# Patient Record
Sex: FEMALE | Race: WHITE | NOT HISPANIC OR LATINO
[De-identification: names, ages, dates, MRNs, and addresses within clinical notes are randomized per-mention and may not be internally consistent; named-entity substitution may affect disease eponyms.]

---

## 2020-08-28 PROBLEM — Z00.00 ENCOUNTER FOR PREVENTIVE HEALTH EXAMINATION: Status: ACTIVE | Noted: 2020-08-28

## 2020-09-21 ENCOUNTER — APPOINTMENT (OUTPATIENT)
Dept: ORTHOPEDIC SURGERY | Facility: CLINIC | Age: 75
End: 2020-09-21
Payer: COMMERCIAL

## 2020-09-21 VITALS — TEMPERATURE: 97.4 F

## 2020-09-21 DIAGNOSIS — T84.84XA PAIN DUE TO INTERNAL ORTHOPEDIC PROSTHETIC DEVICES, IMPLANTS AND GRAFTS, INITIAL ENCOUNTER: ICD-10-CM

## 2020-09-21 DIAGNOSIS — M70.61 TROCHANTERIC BURSITIS, RIGHT HIP: ICD-10-CM

## 2020-09-21 DIAGNOSIS — Z85.51 PERSONAL HISTORY OF MALIGNANT NEOPLASM OF BLADDER: ICD-10-CM

## 2020-09-21 DIAGNOSIS — Z80.3 FAMILY HISTORY OF MALIGNANT NEOPLASM OF BREAST: ICD-10-CM

## 2020-09-21 DIAGNOSIS — Z96.641 PRESENCE OF RIGHT ARTIFICIAL HIP JOINT: ICD-10-CM

## 2020-09-21 DIAGNOSIS — M25.551 PAIN IN RIGHT HIP: ICD-10-CM

## 2020-09-21 PROCEDURE — 99203 OFFICE O/P NEW LOW 30 MIN: CPT

## 2020-09-21 PROCEDURE — 73502 X-RAY EXAM HIP UNI 2-3 VIEWS: CPT | Mod: RT

## 2020-09-21 RX ORDER — DICLOFENAC SODIUM 10 MG/G
1 GEL TOPICAL DAILY
Qty: 1 | Refills: 1 | Status: ACTIVE | COMMUNITY
Start: 2020-09-21 | End: 1900-01-01

## 2020-09-21 RX ORDER — LISINOPRIL AND HYDROCHLOROTHIAZIDE TABLETS 10; 12.5 MG/1; MG/1
10-12.5 TABLET ORAL
Refills: 0 | Status: ACTIVE | COMMUNITY

## 2020-09-21 RX ORDER — MELOXICAM 15 MG/1
15 TABLET ORAL DAILY
Qty: 30 | Refills: 0 | Status: ACTIVE | COMMUNITY
Start: 2020-09-21 | End: 1900-01-01

## 2020-09-21 NOTE — ASSESSMENT
[FreeTextEntry1] : S/p RTH 5 years ago , by another surgeon. Pt comes in with recent onset of right lateral hip pain. After reviewing radiographs and examining the patient, it appears she has developed a trochanteric bursitis. We will treat with PT, Voltaren gel, and 4 weeks of Mobic. F/u PRN

## 2020-09-21 NOTE — HISTORY OF PRESENT ILLNESS
[de-identified] : 75 year old female s/p right total hip replacement in 2015 with Dr. White. Patient is here today complaining of right hip pain that has been getting progressively worse. Patient states her pain is at the lateral hip radiating down the lateral thigh, no groin pain. She reports noticing this pain and discomfort in the last 6 months and reports being okay prior to that. Patient denies any swelling, buckling, locking, or loss of motion. She feels as though she is limping and is only able to ambulate less than one block. Patient states when it comes to negotiating stairs, she goes one step at a time and can not put weight onto her right side. Patient reports taking Advil for pain with minimal relief. Patient does have back related issues and tore her left Achilles tendon in March. Patient is here today to discuss her options for pain relief.

## 2020-09-21 NOTE — PHYSICAL EXAM
[de-identified] : General appearance: well nourished and hydrated, pleasant, alert and oriented x 3, cooperative.\par Cardiovascular: no apparent abnormalities, no lower leg edema, no varicosities, pedal pulses are palpable.\par Neurologic: sensation is normal, no muscle weakness in upper or lower extremities\par Dermatologic no apparent skin lesions, moist, warm, no rash.\par Gait: nonantalgic.\par \par Right hip\par Inspection: No swelling or ecchymosis.\par Wounds: none.\par Palpation: tender over the greater trochanter \par Stability: no instability.\par Strength: 5/5 all motor groups.\par ROM: FROM\par Leg length: equal.\par \par   [de-identified] : Radiographs done today AP pelvis and lateral of the right hip shows the bony structures are intact , there is well aligned cementless hip replacement, no evidence of loosening or wear.

## 2020-09-24 ENCOUNTER — APPOINTMENT (OUTPATIENT)
Dept: NEUROLOGY | Facility: CLINIC | Age: 75
End: 2020-09-24
Payer: COMMERCIAL

## 2020-09-24 PROCEDURE — 99212 OFFICE O/P EST SF 10 MIN: CPT | Mod: 95

## 2020-09-24 NOTE — HISTORY OF PRESENT ILLNESS
[Home] : at home, [unfilled] , at the time of the visit. [Other Location: e.g. Home (Enter Location, City,State)___] : at [unfilled] [Verbal consent obtained from patient] : the patient, [unfilled] [Time Spent: ___ minutes] : I have spent [unfilled] minutes with the patient on the telephone [FreeTextEntry1] : video visit was performed via AV technology in real time\par patient is a 74 yo female previously seen for RLE pain though to be orthopedic in nature. she had achilles tendon rupture due to levaquin and she is going to PT . She reports that since then her balance is off \par the balance trouble started after she came out of the boot for the tendon rupture, she never had surgery\par she states she has to grab on to things \par she thinks she may a bit dizzy as well\par she has a right hip replacement as well.  she denies low back pain but has pain in r hip and went to see dr pabon who told her she had bursitis\par \par PE\par MS intact\par cr nn normal\par motor and gait normal

## 2020-10-09 ENCOUNTER — LABORATORY RESULT (OUTPATIENT)
Age: 75
End: 2020-10-09

## 2020-10-09 ENCOUNTER — OUTPATIENT (OUTPATIENT)
Dept: OUTPATIENT SERVICES | Facility: HOSPITAL | Age: 75
LOS: 1 days | Discharge: HOME | End: 2020-10-09

## 2020-10-09 DIAGNOSIS — Z11.59 ENCOUNTER FOR SCREENING FOR OTHER VIRAL DISEASES: ICD-10-CM

## 2020-10-12 ENCOUNTER — APPOINTMENT (OUTPATIENT)
Dept: NEUROLOGY | Facility: CLINIC | Age: 75
End: 2020-10-12
Payer: COMMERCIAL

## 2020-10-12 DIAGNOSIS — M79.609 PAIN IN UNSPECIFIED LIMB: ICD-10-CM

## 2020-10-12 PROCEDURE — 95908 NRV CNDJ TST 3-4 STUDIES: CPT

## 2020-10-12 PROCEDURE — 95886 MUSC TEST DONE W/N TEST COMP: CPT

## 2020-12-18 ENCOUNTER — TRANSCRIPTION ENCOUNTER (OUTPATIENT)
Age: 75
End: 2020-12-18

## 2023-07-09 ENCOUNTER — EMERGENCY (EMERGENCY)
Facility: HOSPITAL | Age: 78
LOS: 0 days | Discharge: HOME | End: 2023-07-09
Attending: EMERGENCY MEDICINE
Payer: MEDICARE

## 2023-07-09 VITALS
HEIGHT: 62 IN | TEMPERATURE: 98 F | WEIGHT: 132.06 LBS | RESPIRATION RATE: 18 BRPM | DIASTOLIC BLOOD PRESSURE: 103 MMHG | SYSTOLIC BLOOD PRESSURE: 140 MMHG | OXYGEN SATURATION: 99 % | HEART RATE: 93 BPM

## 2023-07-09 DIAGNOSIS — R31.9 HEMATURIA, UNSPECIFIED: ICD-10-CM

## 2023-07-09 DIAGNOSIS — R33.9 RETENTION OF URINE, UNSPECIFIED: ICD-10-CM

## 2023-07-09 DIAGNOSIS — R31.0 GROSS HEMATURIA: ICD-10-CM

## 2023-07-09 DIAGNOSIS — Z85.51 PERSONAL HISTORY OF MALIGNANT NEOPLASM OF BLADDER: ICD-10-CM

## 2023-07-09 DIAGNOSIS — I10 ESSENTIAL (PRIMARY) HYPERTENSION: ICD-10-CM

## 2023-07-09 LAB
APPEARANCE UR: ABNORMAL
BILIRUB UR-MCNC: NEGATIVE — SIGNIFICANT CHANGE UP
COLOR SPEC: ABNORMAL
DIFF PNL FLD: ABNORMAL
GLUCOSE UR QL: NEGATIVE — SIGNIFICANT CHANGE UP
KETONES UR-MCNC: SIGNIFICANT CHANGE UP
LEUKOCYTE ESTERASE UR-ACNC: ABNORMAL
NITRITE UR-MCNC: NEGATIVE — SIGNIFICANT CHANGE UP
PH UR: 6.5 — SIGNIFICANT CHANGE UP (ref 5–8)
PROT UR-MCNC: ABNORMAL
SP GR SPEC: 1.02 — SIGNIFICANT CHANGE UP (ref 1.01–1.03)
UROBILINOGEN FLD QL: SIGNIFICANT CHANGE UP

## 2023-07-09 PROCEDURE — 81001 URINALYSIS AUTO W/SCOPE: CPT

## 2023-07-09 PROCEDURE — 99283 EMERGENCY DEPT VISIT LOW MDM: CPT | Mod: 25

## 2023-07-09 PROCEDURE — 99284 EMERGENCY DEPT VISIT MOD MDM: CPT | Mod: FS

## 2023-07-09 PROCEDURE — 87086 URINE CULTURE/COLONY COUNT: CPT

## 2023-07-09 RX ORDER — CEFPODOXIME PROXETIL 100 MG
1 TABLET ORAL
Qty: 14 | Refills: 0
Start: 2023-07-09 | End: 2023-07-15

## 2023-07-09 NOTE — ED ADULT NURSE NOTE - NSFALLUNIVINTERV_ED_ALL_ED
Bed/Stretcher in lowest position, wheels locked, appropriate side rails in place/Call bell, personal items and telephone in reach/Instruct patient to call for assistance before getting out of bed/chair/stretcher/Non-slip footwear applied when patient is off stretcher/[place] to call system/Physically safe environment - no spills, clutter or unnecessary equipment/Purposeful proactive rounding/Room/bathroom lighting operational, light cord in reach

## 2023-07-09 NOTE — ED PROVIDER NOTE - PATIENT PORTAL LINK FT
You can access the FollowMyHealth Patient Portal offered by [facility] by registering at the following website: [email or web address]. By joining [employer]’s FollowMyHealth portal, you will also be able to view your health information using other applications (apps) compatible with our system.

## 2023-07-09 NOTE — ED PROVIDER NOTE - NSFOLLOWUPINSTRUCTIONS_ED_ALL_ED_FT
Our Emergency Department Referral Coordinators will be reaching out to you in the next 24-48 hours from 9:00am to 5:00pm with a follow up appointment. Please expect a phone call from the hospital in that time frame. If you do not receive a call or if you have any questions or concerns, you can reach them at   [phone]     You were noted to have blood in the urine. This sometimes is a benign condition, but the only way to know is to have it formerly evaluated. You should follow up with a specialist called a Urologist so that they can evaluate it further to be sure that there is no intervention that is needed.

## 2023-07-09 NOTE — ED ADULT TRIAGE NOTE - CHIEF COMPLAINT QUOTE
Pt states yesterday she went urinated and it looked like a piece of "liver" came out of her. Today pt complaining of urinary retention. She states she has the urge but has not peed since this morning. hx of bladder ca- in remission

## 2023-07-09 NOTE — ED PROVIDER NOTE - PHYSICAL EXAMINATION
CONSTITUTIONAL: in no apparent distress.   ENT: Hearing is intact with good acuity to spoken voice.  Patient is speaking clearly, not muffled and airway is intact.   RESPIRATORY: No signs of respiratory distress. Lung sounds are clear in all lobes bilaterally without rales, rhonchi, or wheezes.  CARDIOVASCULAR: Regular rate and rhythm.   GI: Abdomen is soft, non-tender, and without distention. Bowel sounds are present and normoactive in all four quadrants. No masses are noted.   NEURO: A & O x 3. Normal speech. No focal deficit.  PSYCHOLOGICAL: Appropriate mood and affect. Good judgement and insight.

## 2023-07-09 NOTE — ED PROVIDER NOTE - OBJECTIVE STATEMENT
78-year-old female with a past medical history of bladder cancer in remission and hypertension who presents with urinary retention.  Reports that she started noticing hematuria since 3:00 AM this morning and has not been able to urinate since then although with the urge to urinate.  Also endorses pressure sensation in her suprapubic area.  Denies fever, shortness of breath, chest pain, nausea, vomiting, dysuria, melena, hematochezia, and bleeding from elsewhere.

## 2023-07-09 NOTE — ED PROVIDER NOTE - CLINICAL SUMMARY MEDICAL DECISION MAKING FREE TEXT BOX
78-year-old female present to the ED for hematuria.  Prior history of bladder cancer.  Abdomen mildly distended.  Mild tenderness to palpation.  Arteaga placed.  200 cc of bloody urine output.  Patient states that she has relief of pain.  UA revealed leuk esterase and gross hematuria.  Antibiotic sent to pharmacy.  Given outpatient referral follow-up for hematuria follow-up and leg bag Arteaga removal.  Leg bag instructions given.  Discharged home.  Return precautions for persistent pain, retention advised the patient.

## 2023-07-09 NOTE — ED ADULT NURSE NOTE - OBJECTIVE STATEMENT
Pt with h/o bladder Ca, presents to the ED with urinary discomfort and retention x1 day. Pt stated that she has urge to urinate but unable to urinate. Pt also reported blood discharge. Denies any fever, chills or night sweats.

## 2023-07-10 ENCOUNTER — INPATIENT (INPATIENT)
Facility: HOSPITAL | Age: 78
LOS: 7 days | Discharge: HOME | DRG: 669 | End: 2023-07-18
Attending: HOSPITALIST | Admitting: UROLOGY
Payer: MEDICARE

## 2023-07-10 VITALS
HEIGHT: 62 IN | TEMPERATURE: 98 F | DIASTOLIC BLOOD PRESSURE: 83 MMHG | WEIGHT: 132.06 LBS | HEART RATE: 109 BPM | SYSTOLIC BLOOD PRESSURE: 126 MMHG | OXYGEN SATURATION: 99 % | RESPIRATION RATE: 18 BRPM

## 2023-07-10 DIAGNOSIS — N32.89 OTHER SPECIFIED DISORDERS OF BLADDER: ICD-10-CM

## 2023-07-10 LAB
ALBUMIN SERPL ELPH-MCNC: 3.8 G/DL — SIGNIFICANT CHANGE UP (ref 3.5–5.2)
ALP SERPL-CCNC: 61 U/L — SIGNIFICANT CHANGE UP (ref 30–115)
ALT FLD-CCNC: 14 U/L — SIGNIFICANT CHANGE UP (ref 0–41)
ANION GAP SERPL CALC-SCNC: 13 MMOL/L — SIGNIFICANT CHANGE UP (ref 7–14)
APPEARANCE UR: ABNORMAL
AST SERPL-CCNC: 24 U/L — SIGNIFICANT CHANGE UP (ref 0–41)
BACTERIA # UR AUTO: NEGATIVE — SIGNIFICANT CHANGE UP
BASOPHILS # BLD AUTO: 0.04 K/UL — SIGNIFICANT CHANGE UP (ref 0–0.2)
BASOPHILS NFR BLD AUTO: 0.3 % — SIGNIFICANT CHANGE UP (ref 0–1)
BILIRUB SERPL-MCNC: 0.7 MG/DL — SIGNIFICANT CHANGE UP (ref 0.2–1.2)
BILIRUB UR-MCNC: NEGATIVE — SIGNIFICANT CHANGE UP
BLD GP AB SCN SERPL QL: SIGNIFICANT CHANGE UP
BUN SERPL-MCNC: 35 MG/DL — HIGH (ref 10–20)
CALCIUM SERPL-MCNC: 9.6 MG/DL — SIGNIFICANT CHANGE UP (ref 8.4–10.5)
CHLORIDE SERPL-SCNC: 105 MMOL/L — SIGNIFICANT CHANGE UP (ref 98–110)
CO2 SERPL-SCNC: 24 MMOL/L — SIGNIFICANT CHANGE UP (ref 17–32)
COLOR SPEC: ABNORMAL
CREAT SERPL-MCNC: 1.3 MG/DL — SIGNIFICANT CHANGE UP (ref 0.7–1.5)
DIFF PNL FLD: ABNORMAL
EGFR: 42 ML/MIN/1.73M2 — LOW
EOSINOPHIL # BLD AUTO: 0.07 K/UL — SIGNIFICANT CHANGE UP (ref 0–0.7)
EOSINOPHIL NFR BLD AUTO: 0.5 % — SIGNIFICANT CHANGE UP (ref 0–8)
EPI CELLS # UR: 1 /HPF — SIGNIFICANT CHANGE UP (ref 0–5)
GLUCOSE SERPL-MCNC: 132 MG/DL — HIGH (ref 70–99)
GLUCOSE UR QL: NEGATIVE — SIGNIFICANT CHANGE UP
HCT VFR BLD CALC: 42.3 % — SIGNIFICANT CHANGE UP (ref 37–47)
HCT VFR BLD CALC: 42.4 % — SIGNIFICANT CHANGE UP (ref 37–47)
HGB BLD-MCNC: 13.7 G/DL — SIGNIFICANT CHANGE UP (ref 12–16)
HGB BLD-MCNC: 13.8 G/DL — SIGNIFICANT CHANGE UP (ref 12–16)
HYALINE CASTS # UR AUTO: 5 /LPF — SIGNIFICANT CHANGE UP (ref 0–7)
IMM GRANULOCYTES NFR BLD AUTO: 0.5 % — HIGH (ref 0.1–0.3)
KETONES UR-MCNC: SIGNIFICANT CHANGE UP
LEUKOCYTE ESTERASE UR-ACNC: ABNORMAL
LYMPHOCYTES # BLD AUTO: 1.13 K/UL — LOW (ref 1.2–3.4)
LYMPHOCYTES # BLD AUTO: 8.6 % — LOW (ref 20.5–51.1)
MCHC RBC-ENTMCNC: 28 PG — SIGNIFICANT CHANGE UP (ref 27–31)
MCHC RBC-ENTMCNC: 28 PG — SIGNIFICANT CHANGE UP (ref 27–31)
MCHC RBC-ENTMCNC: 32.4 G/DL — SIGNIFICANT CHANGE UP (ref 32–37)
MCHC RBC-ENTMCNC: 32.5 G/DL — SIGNIFICANT CHANGE UP (ref 32–37)
MCV RBC AUTO: 86.2 FL — SIGNIFICANT CHANGE UP (ref 81–99)
MCV RBC AUTO: 86.3 FL — SIGNIFICANT CHANGE UP (ref 81–99)
MONOCYTES # BLD AUTO: 1.06 K/UL — HIGH (ref 0.1–0.6)
MONOCYTES NFR BLD AUTO: 8.1 % — SIGNIFICANT CHANGE UP (ref 1.7–9.3)
NEUTROPHILS # BLD AUTO: 10.74 K/UL — HIGH (ref 1.4–6.5)
NEUTROPHILS NFR BLD AUTO: 82 % — HIGH (ref 42.2–75.2)
NITRITE UR-MCNC: NEGATIVE — SIGNIFICANT CHANGE UP
NRBC # BLD: 0 /100 WBCS — SIGNIFICANT CHANGE UP (ref 0–0)
NRBC # BLD: 0 /100 WBCS — SIGNIFICANT CHANGE UP (ref 0–0)
PH UR: 6.5 — SIGNIFICANT CHANGE UP (ref 5–8)
PLATELET # BLD AUTO: 276 K/UL — SIGNIFICANT CHANGE UP (ref 130–400)
PLATELET # BLD AUTO: 307 K/UL — SIGNIFICANT CHANGE UP (ref 130–400)
PMV BLD: 9.8 FL — SIGNIFICANT CHANGE UP (ref 7.4–10.4)
PMV BLD: 9.9 FL — SIGNIFICANT CHANGE UP (ref 7.4–10.4)
POTASSIUM SERPL-MCNC: 3.7 MMOL/L — SIGNIFICANT CHANGE UP (ref 3.5–5)
POTASSIUM SERPL-SCNC: 3.7 MMOL/L — SIGNIFICANT CHANGE UP (ref 3.5–5)
PROT SERPL-MCNC: 5.9 G/DL — LOW (ref 6–8)
PROT UR-MCNC: ABNORMAL
RBC # BLD: 4.9 M/UL — SIGNIFICANT CHANGE UP (ref 4.2–5.4)
RBC # BLD: 4.92 M/UL — SIGNIFICANT CHANGE UP (ref 4.2–5.4)
RBC # FLD: 14.2 % — SIGNIFICANT CHANGE UP (ref 11.5–14.5)
RBC # FLD: 14.3 % — SIGNIFICANT CHANGE UP (ref 11.5–14.5)
RBC CASTS # UR COMP ASSIST: >720 /HPF — HIGH (ref 0–4)
SODIUM SERPL-SCNC: 142 MMOL/L — SIGNIFICANT CHANGE UP (ref 135–146)
SP GR SPEC: >1.05 (ref 1.01–1.03)
UROBILINOGEN FLD QL: SIGNIFICANT CHANGE UP
WBC # BLD: 13.11 K/UL — HIGH (ref 4.8–10.8)
WBC # BLD: 14.72 K/UL — HIGH (ref 4.8–10.8)
WBC # FLD AUTO: 13.11 K/UL — HIGH (ref 4.8–10.8)
WBC # FLD AUTO: 14.72 K/UL — HIGH (ref 4.8–10.8)
WBC UR QL: 555 /HPF — HIGH (ref 0–5)

## 2023-07-10 PROCEDURE — 76770 US EXAM ABDO BACK WALL COMP: CPT

## 2023-07-10 PROCEDURE — 80048 BASIC METABOLIC PNL TOTAL CA: CPT

## 2023-07-10 PROCEDURE — 85027 COMPLETE CBC AUTOMATED: CPT

## 2023-07-10 PROCEDURE — 88342 IMHCHEM/IMCYTCHM 1ST ANTB: CPT

## 2023-07-10 PROCEDURE — 99223 1ST HOSP IP/OBS HIGH 75: CPT

## 2023-07-10 PROCEDURE — 36415 COLL VENOUS BLD VENIPUNCTURE: CPT

## 2023-07-10 PROCEDURE — 80053 COMPREHEN METABOLIC PANEL: CPT

## 2023-07-10 PROCEDURE — 88360 TUMOR IMMUNOHISTOCHEM/MANUAL: CPT

## 2023-07-10 PROCEDURE — 86850 RBC ANTIBODY SCREEN: CPT

## 2023-07-10 PROCEDURE — 88307 TISSUE EXAM BY PATHOLOGIST: CPT

## 2023-07-10 PROCEDURE — 87186 SC STD MICRODIL/AGAR DIL: CPT

## 2023-07-10 PROCEDURE — 86900 BLOOD TYPING SEROLOGIC ABO: CPT

## 2023-07-10 PROCEDURE — 74177 CT ABD & PELVIS W/CONTRAST: CPT | Mod: 26,MA

## 2023-07-10 PROCEDURE — 88344 IMHCHEM/IMCYTCHM EA MLT ANTB: CPT

## 2023-07-10 PROCEDURE — 87077 CULTURE AEROBIC IDENTIFY: CPT

## 2023-07-10 PROCEDURE — 93005 ELECTROCARDIOGRAM TRACING: CPT

## 2023-07-10 PROCEDURE — 81001 URINALYSIS AUTO W/SCOPE: CPT

## 2023-07-10 PROCEDURE — 85610 PROTHROMBIN TIME: CPT

## 2023-07-10 PROCEDURE — 76705 ECHO EXAM OF ABDOMEN: CPT

## 2023-07-10 PROCEDURE — 88341 IMHCHEM/IMCYTCHM EA ADD ANTB: CPT

## 2023-07-10 PROCEDURE — 86901 BLOOD TYPING SEROLOGIC RH(D): CPT

## 2023-07-10 PROCEDURE — 85730 THROMBOPLASTIN TIME PARTIAL: CPT

## 2023-07-10 PROCEDURE — 93010 ELECTROCARDIOGRAM REPORT: CPT

## 2023-07-10 PROCEDURE — 85025 COMPLETE CBC W/AUTO DIFF WBC: CPT

## 2023-07-10 PROCEDURE — 71045 X-RAY EXAM CHEST 1 VIEW: CPT

## 2023-07-10 PROCEDURE — 99285 EMERGENCY DEPT VISIT HI MDM: CPT | Mod: GC

## 2023-07-10 PROCEDURE — 87086 URINE CULTURE/COLONY COUNT: CPT

## 2023-07-10 PROCEDURE — 83036 HEMOGLOBIN GLYCOSYLATED A1C: CPT

## 2023-07-10 PROCEDURE — 80061 LIPID PANEL: CPT

## 2023-07-10 PROCEDURE — 82977 ASSAY OF GGT: CPT

## 2023-07-10 PROCEDURE — 83735 ASSAY OF MAGNESIUM: CPT

## 2023-07-10 RX ORDER — CEFTRIAXONE 500 MG/1
INJECTION, POWDER, FOR SOLUTION INTRAMUSCULAR; INTRAVENOUS
Refills: 0 | Status: COMPLETED | OUTPATIENT
Start: 2023-07-10 | End: 2023-07-14

## 2023-07-10 RX ORDER — CEFTRIAXONE 500 MG/1
1000 INJECTION, POWDER, FOR SOLUTION INTRAMUSCULAR; INTRAVENOUS ONCE
Refills: 0 | Status: COMPLETED | OUTPATIENT
Start: 2023-07-10 | End: 2023-07-10

## 2023-07-10 RX ORDER — POLYETHYLENE GLYCOL 3350 17 G/17G
17 POWDER, FOR SOLUTION ORAL DAILY
Refills: 0 | Status: DISCONTINUED | OUTPATIENT
Start: 2023-07-10 | End: 2023-07-17

## 2023-07-10 RX ORDER — SENNA PLUS 8.6 MG/1
2 TABLET ORAL AT BEDTIME
Refills: 0 | Status: DISCONTINUED | OUTPATIENT
Start: 2023-07-10 | End: 2023-07-17

## 2023-07-10 RX ORDER — LISINOPRIL/HYDROCHLOROTHIAZIDE 10-12.5 MG
1 TABLET ORAL
Refills: 0 | DISCHARGE

## 2023-07-10 RX ORDER — CEFTRIAXONE 500 MG/1
1000 INJECTION, POWDER, FOR SOLUTION INTRAMUSCULAR; INTRAVENOUS EVERY 24 HOURS
Refills: 0 | Status: COMPLETED | OUTPATIENT
Start: 2023-07-11 | End: 2023-07-13

## 2023-07-10 RX ORDER — SODIUM CHLORIDE 9 MG/ML
1000 INJECTION, SOLUTION INTRAVENOUS
Refills: 0 | Status: DISCONTINUED | OUTPATIENT
Start: 2023-07-10 | End: 2023-07-17

## 2023-07-10 RX ADMIN — CEFTRIAXONE 100 MILLIGRAM(S): 500 INJECTION, POWDER, FOR SOLUTION INTRAMUSCULAR; INTRAVENOUS at 21:06

## 2023-07-10 RX ADMIN — SODIUM CHLORIDE 75 MILLILITER(S): 9 INJECTION, SOLUTION INTRAVENOUS at 17:19

## 2023-07-10 NOTE — CONSULT NOTE ADULT - SUBJECTIVE AND OBJECTIVE BOX
Urology Consult    Daughter and granddaughter at bedside    Pt is a 79 y/o Female with extensive urologic history. 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 Pt underwent follow up cystoscopies for 7 years without recurrence by Dr. Strong.  Pt was deemed cured and has not seen anyone for the past 6 years. She now presents with gross hematuria. Pt admits to intermittent hematuria for the past year.  Pt never followed up or sought out another urologist. She denies dysuria, urgency, frequency.        PAST MEDICAL & SURGICAL HISTORY:  Bladder cancer    Hypertension    Hyperlipidemia    MEDICATIONS  (STANDING):  lactated ringers. 1000 milliLiter(s) (75 mL/Hr) IV Continuous <Continuous>  polyethylene glycol 3350 17 Gram(s) Oral daily  senna 2 Tablet(s) Oral at bedtime    Allergies    No Known Allergies    SOCIAL HISTORY: No illicit drug use    FAMILY HISTORY:  non contributory to current issue    REVIEW OF SYSTEMS   [x] A ten-point review of systems was otherwise negative except as noted.    Vital Signs Last 24 Hrs  T(C): 35.8 (10 Jul 2023 16:21), Max: 36.7 (09 Jul 2023 18:15)  T(F): 96.5 (10 Jul 2023 16:21), Max: 98 (09 Jul 2023 18:15)  HR: 108 (10 Jul 2023 16:21) (93 - 109)  BP: 124/69 (10 Jul 2023 16:21) (124/69 - 140/103)  RR: 18 (10 Jul 2023 16:21) (18 - 18)  SpO2: 98% (10 Jul 2023 16:21) (98% - 99%)    Parameters below as of 10 Jul 2023 16:21  Patient On (Oxygen Delivery Method): room air        PHYSICAL EXAM:    GEN: NAD, awake and alert.  SKIN: Good color, non diaphoretic.  RESP: Non-labored breathing. No use of accessory muscles.  ABDO: Soft, NT/ND, + palpable mass right lower quadrant adjacent to the bladder, no suprapubic tenderness.  BACK: No CVAT B/L  :  + Indwelling Arteaga in place, draining blood tinged urine.   EXT: COLEMAN x 4      I&O's Summary      LABS:                        13.7   13.11 )-----------( 276      ( 10 Jul 2023 12:17 )             42.3     07-10    142  |  105  |  35<H>  ----------------------------<  132<H>  3.7   |  24  |  1.3    Ca    9.6      10 Jul 2023 12:17    TPro  5.9<L>  /  Alb  3.8  /  TBili  0.7  /  DBili  x   /  AST  24  /  ALT  14  /  AlkPhos  61  07-10      Urinalysis Basic - ( 10 Jul 2023 12:17 )    Color: x / Appearance: x / SG: x / pH: x  Gluc: 132 mg/dL / Ketone: x  / Bili: x / Urobili: x   Blood: x / Protein: x / Nitrite: x   Leuk Esterase: x / RBC: x / WBC x   Sq Epi: x / Non Sq Epi: x / Bacteria: x            RADIOLOGY & ADDITIONAL STUDIES:    < from: CT Abdomen and Pelvis w/ IV Cont (07.10.23 @ 12:57) >    ACC: [id number] EXAM:  CT ABDOMEN AND PELVIS IC   ORDERED BY: ALEJO AYERS     PROCEDURE DATE:  07/10/2023          INTERPRETATION:  CLINICAL HISTORY: Urinary retention. History of bladder   cancer..    TECHNIQUE: Contiguous axial CT images wereobtained from the lower chest   to the pubic symphysis following administration of 100cc Omnipaque 350   intravenous contrast. Oral contrast was not administered. Reformatted   images in the coronal and sagittal planes were acquired.    COMPARISON: None.      FINDINGS:    LOWER CHEST: 4 mm right middle lobe pulmonary nodule.    HEPATOBILIARY: Status post cholecystectomy. Intrahepatic and extrahepatic   biliary ductal dilatation.    SPLEEN: Indeterminate subcentimeter hepatic hypodensities.    PANCREAS: Unremarkable.    ADRENAL GLANDS: Indeterminate 1.6 cm right adrenal nodule.    KIDNEYS/BLADDER/PELVIC ORGANS: Limited assessment of pelvic organs due to   extensive artifact from right hip prosthesis. Large infiltrative soft   tissue mass in the pelvis involving the bladder and appears to invade the   uterus, measuring at least 6.3 cm (4/250). Severe right-sided   hydroureteronephrosis to the level of the distal ureter which appears to   be involved by this infiltrating mass as well. There appears to be a   Arteaga catheter in the bladder. Additional areas of nodularity in the   bladder likely representing additional lesions.    ABDOMINOPELVIC NODES: Limited evaluation of pelvic lymphadenopathy due to   hip hardware.    PERITONEUM/MESENTERY/BOWEL:  No evidence of bowel obstruction ascites or   free air.    BONES/SOFT TISSUES: Scoliosis. Degenerative changes of the spine. Right   hip arthroplasty.    OTHER: Atherosclerotic calcifications      IMPRESSION:    Limited assessment of pelvicorgans due to extensive artifact from right   hip prosthesis. Large infiltrative soft tissue mass in the pelvis   involving the bladder and appears to invade the uterus, measuring at   least 6.3 cm. Severe right-sided hydroureteronephrosis to the level of   the distal ureter which appears to be involved by this infiltrating mass   as well. There appears to be a Arteaga catheter in the bladder. Additional   areas of nodularity in the bladder likely representing additional lesions.    Intrahepatic and extrahepatic biliary ductal dilatation. While this could   be seen post cholecystectomy, recommend clinical correlation including   with LFTs. No prior imaging available for comparison.    4 mm right middle lobe pulmonary nodule. Follow-up as per oncology   protocol.    Indeterminate 1.6 cm right adrenal nodule. Correlate with prior imaging.    Indeterminate splenic hypodensities. Correlate with prior imaging.    --- End of Report ---    BRYAN DAVENPORT MD; Attending Radiologist  This document has been electronically signed. Jul 10 2023  1:42PM    < end of copied text >

## 2023-07-10 NOTE — ED PROVIDER NOTE - ATTENDING CONTRIBUTION TO CARE
79 yo F with hx of bladder cancer (Diagnosed 10 years ago, s/p Intravesical therapy, No chemo or radiation, in remission), HTN and HLD presenting for Hematuria and decreased urine output.  Here pt found to have hematuria likely due to recurrence bladder cancer. Urology consulted, gutierrez irrigated admitted for further eval and tx

## 2023-07-10 NOTE — ED PROVIDER NOTE - OBJECTIVE STATEMENT
Patient is a 78-year-old female with history of bladder cancer, in remission over 10 years ago, hypertension presenting for decreased output from Arteaga catheter.  Patient presents to the emergency department 1 days ago for gross hematuria, urinary retention.  Patient also endorses mild hematuria 1 week ago.  Arteaga catheter placed yesterday, overnight, patient with decreased output from Arteaga catheter.  Denies abdominal pain or pressure.  Denies fever, chills.  Denies flank pain.  Patient otherwise well

## 2023-07-10 NOTE — H&P ADULT - ATTENDING COMMENTS
Patient seen and examined at bedside independently of the residents. I read the resident's note and agree with the plan with the additions and corrections as noted below. My note supersedes the resident's note.     REVIEW OF SYSTEMS:  CONSTITUTIONAL: No weakness, fevers or chills  EYES/ENT: No visual changes;  No vertigo or throat pain   NECK: No pain or stiffness  RESPIRATORY: No cough, wheezing, hemoptysis; No shortness of breath  CARDIOVASCULAR: No chest pain or palpitations  GASTROINTESTINAL: No abdominal or epigastric pain. No nausea, vomiting, or hematemesis; No diarrhea or constipation. No melena or hematochezia.  GENITOURINARY: No dysuria, frequency. + hematuria  NEUROLOGICAL: No numbness or weakness  MSK: No pain. No weakness.   SKIN: No itching, rashes.     PMH: bladder cancer (Diagnosed 10 years ago, s/p Intravesical therapy, No chemo or radiation, in remission), HTN and HLD    FHx: Reviewed. No fhx of asthma/copd, No fhx of liver and pulmonary disease. No fhx of hematological disorder.     Physical Exam:  GEN: No acute distress. Awake, Alert and oriented x 3.   Head: Atraumatic, Normocephalic.   Eye: PEERLA. No sclera icterus. EOMI.   ENT: Normal oropharynx, no thyromegaly, no mass, no lymphadenopathy.   LUNGS: Clear to auscultation bilaterally. No wheeze/rales/crackles.   HEART: Normal. S1/S2 present. RRR. No murmur/gallops.   ABD: Soft, non-tender, non-distended. Bowel sounds present.   EXT: No pitting edema. No erythema. No tenderness.  Integumentary: No rash, No sore, No petechia.   NEURO: CN III-XII intact. Strength: 5/5 b/l ULE. Sensory intact b/l ULE.     Vital Signs Last 24 Hrs  T(C): 35.8 (10 Jul 2023 16:21), Max: 36.4 (10 Jul 2023 10:11)  T(F): 96.5 (10 Jul 2023 16:21), Max: 97.6 (10 Jul 2023 10:11)  HR: 108 (10 Jul 2023 16:21) (108 - 109)  BP: 124/69 (10 Jul 2023 16:21) (124/69 - 126/83)  BP(mean): --  RR: 18 (10 Jul 2023 16:21) (18 - 18)  SpO2: 98% (10 Jul 2023 16:21) (98% - 99%)    Parameters below as of 10 Jul 2023 16:21  Patient On (Oxygen Delivery Method): room air        Please see the above notes for Labs and radiology.     Assessment and Plan:     79 yo F with hx of bladder cancer (Diagnosed 10 years ago, s/p Intravesical therapy, No chemo or radiation, in remission), HTN and HLD presenting for Hematuria and decreased urine output.     Hematuria likely 2/2 recurrence bladder cancer   - CT abdomen shows Large infiltrative soft tissue mass in the pelvis involving the bladder and appears to invade the uterus, measuring at least 6.3 cm. Severe right-sided hydroureteronephrosis to the level of the distal ureter which appears to be involved by this infiltrating mass as well. There appears to be a Arteaga catheter in the bladder. Additional areas of nodularity in the bladder likely representing additional lesions.  - UA also positive for WBC. Will treat with ceftriaxone as patient also has hydronephrosis.   - monitor CBC and transfuse if Hb < 7.   - c/w Arteaga with bladder irrigation for now.   - NPO after MN for possible urological procedure  - f/u Urology    Severe right sided hydroureteronephrosis  - Serum Cr stable 1.3.   - NPO after MN for now.   - will get IR consult for nephrostomy tube placement.     Biliary ductal dilation   - CT abdomen shows Intrahepatic and extrahepatic biliary ductal dilatation. While this could be seen post cholecystectomy, recommend clinical correlation including with LFTs. No prior imaging available for comparison.  - LFT wnl.   - check RUQ US     Right pulmonary nodule  - CT abdomen shows 4 mm right middle lobe pulmonary nodule.   - consider CT chest    Right adrenal nodule/ splenic hypodensities   - CT abdomen shows Indeterminate 1.6 cm right adrenal nodule.  - consider MRI abdomen w/wo cont     CKD stage 3   - serum Cr 1.3 which is stable at baseline.   - monitor BMP     DVT ppx: SCD  GI ppx:  not indicated.   Diet: NPO after MN   Activity: as tolerated.     Date seen by the attendin/10/2023  Total time spent: 75 minutes.

## 2023-07-10 NOTE — ED PROVIDER NOTE - CLINICAL SUMMARY MEDICAL DECISION MAKING FREE TEXT BOX
77 yo F with hx of bladder cancer (Diagnosed 10 years ago, s/p Intravesical therapy, No chemo or radiation, in remission), HTN and HLD presenting for Hematuria and decreased urine output.  Here pt found to have hematuria likely due to recurrence bladder cancer. Urology consulted, gutierrez irrigated admitted for further eval and tx

## 2023-07-10 NOTE — H&P ADULT - ASSESSMENT
78-year-old female with history of bladder cancer (Diagnosed 10 years ago, s/p Intravesical therapy, No chemo or radiation, in remission), HTN and HLD presenting for Hematuria and decreased urine output. At baseline, patient is independent in ambulation.    #Recurrence of bladder cancer w/ concern for mets  #Hematuria  -Urology on board, awaiting final recs  -Will need CT chest w/IV contrast for metastatic work up  -Need oncology follow up after biopsy  -Active T&S, CBC BID for now  -Bladder irrigation as per urology    #Leucocytosis 2/2 Ca  -Monitor off Abx  -UA -ve for bacteria  -Fu Urine Cx- If +ve will need treatment, as urology will do intervention    #CKD 3b- stable- Cr from 2022 stable  #HTN- On Lisinopril and HCTZ at home- Will hold given the CKD 3b and contrast administration    #Misc  -DVT prophylaxis: SCD  -GI prophylaxis: None  -Diet: DASH  -Code status: Full  -Activity: IAT  -Bowel regimen: Senna, Miralax  -Urinary catheter: Arteaga  -Dispo: Floors    -Med rec confirmed with patient   78-year-old female with history of bladder cancer (Diagnosed 10 years ago, s/p Intravesical therapy, No chemo or radiation, in remission), HTN and HLD presenting for Hematuria and decreased urine output. At baseline, patient is independent in ambulation.    #Recurrence of bladder cancer w/ concern for mets  #Hematuria  -CT A&P shows adrenal nodule, splenic hypodensities and pulmonary nodule  -Urology on board, awaiting final recs  -Will need CT chest w/IV contrast for metastatic work up 24 hrs later, due to CKD, also give fluids before and after the scan  -Need oncology follow up after biopsy  -Active T&S, CBC BID for now  -Bladder irrigation as per urology    #Leucocytosis 2/2 Ca  -Monitor off Abx  -UA -ve for bacteria  -Fu Urine Cx- If +ve will need treatment, as urology will do intervention    #Extra and intrahepatic dilation  -No symptoms, LFTs wnl  -Fu GGT and RUQ sono    #CKD 3b- stable- Cr from 2022 stable  #HTN- On Lisinopril and HCTZ at home- Will hold given the CKD 3b and contrast administration    #Misc  -DVT prophylaxis: SCD  -GI prophylaxis: None  -Diet: DASH  -Code status: Full  -Activity: IAT  -Bowel regimen: Senna, Miralax  -Urinary catheter: Arteaga  -Dispo: Floors    -Med rec confirmed with patient   78-year-old female with history of bladder cancer (Diagnosed 10 years ago, s/p Intravesical therapy, No chemo or radiation, in remission), HTN and HLD presenting for Hematuria and decreased urine output. At baseline, patient is independent in ambulation.    #Recurrence of bladder cancer w/ concern for mets  #Hematuria  #Severe Right hydronephrosis  -CT A&P shows adrenal nodule, splenic hypodensities and pulmonary nodule  -Urology on board, awaiting final recs  -Will need CT chest w/IV contrast for metastatic work up 24 hrs later, due to CKD, also give fluids before and after the scan  -Need oncology follow up after biopsy  -Active T&S, CBC BID for now  -Bladder irrigation as per urology  -Will need medical clearance  -NPO after midnight for possible procedure tomorrow    #Leucocytosis 2/2 Ca  -Monitor off Abx  -UA -ve for bacteria  -Fu Urine Cx- If +ve will need treatment, as urology will do intervention    #Extra and intrahepatic dilation  -No symptoms, LFTs wnl  -Fu GGT and RUQ sono    #CKD 3b- stable- Cr from 2022 stable  #HTN- On Lisinopril and HCTZ at home- Will hold given the CKD 3b and contrast administration    #Misc  -DVT prophylaxis: SCD  -GI prophylaxis: None  -Diet: DASH  -Code status: Full  -Activity: IAT  -Bowel regimen: Senna, Miralax  -Urinary catheter: Arteaga  -Dispo: Floors    -Med rec confirmed with patient

## 2023-07-10 NOTE — H&P ADULT - NSHPPHYSICALEXAM_GEN_ALL_CORE
CONSTITUTIONAL: Well-developed; well-nourished; NAD  SKIN: warm, dry, w/o rash  HEAD: NCAT  EYES: PERRLA, EOMI, no conjunctival injection  ENT: No nasal discharge; nl OP without erythema or exudates  NECK: Supple, non-tender  CARD: nl S1, S2; RRR, no MRG, no JVD  RESP: CTAB, normal respiratoy effort  ABD: BS+, soft, NTND, no HSM. Palpable lower abdominal mass  EXT: Normal ROM.  No clubbing, cyanosis or edema  NEURO: Alert, oriented, grossly unremarkable  PSYCH: Cooperative, appropriate

## 2023-07-10 NOTE — H&P ADULT - HISTORY OF PRESENT ILLNESS
78-year-old female with history of bladder cancer (Diagnosed 10 years ago, s/p Intravesical therapy, No chemo or radiation, in remission), HTN and HLD presenting for Hematuria and decreased urine output. At baseline, patient is independent in ambulation.    Patient was in her usual state of health 1 year ago when she started having occasional on and off hematuria, it was not bothering her. 2 days ago patient had decreased urine output for which she presented to ED, gutierrez catheter was placed which drained bloody urine, patient was discharged but 1 day later the gutierrez was clogged and patient presented to ED. The gutierrez was flushed and bloody urine was drained with concern for clots. Denies HA, dizziness, NVD, fever, SOB, chest pain, abdominal pain, change in urination and change in bowel habits. Of note, patient endorses intentional 45 lbs weight loss since 2 years and lower back pain since 3 months.    In the ED, CT A&P shows 6.3 cm bladder mass invading the uterus and right ureter with hydronephrosis, 1.6cm adrenal nodule, 4mm right middle lobe lung nodule, splenic hypodensity and intra and extra hepatic biliary dilation. WBC 13K, no signs of infection.  Vital Signs Last 24 Hrs:  T(F): 96.5 (10 Jul 2023 16:21), Max: 97.6 (10 Jul 2023 10:11)  HR: 108 (10 Jul 2023 16:21) (108 - 109)  BP: 124/69 (10 Jul 2023 16:21) (124/69 - 126/83)  RR: 18 (10 Jul 2023 16:21) (18 - 18)  SpO2: 98% (10 Jul 2023 16:21) (98% - 99%) on RA

## 2023-07-10 NOTE — CONSULT NOTE ADULT - ASSESSMENT
79 y/o f with gross hematuria, most likely recurrence of her bladder cancer.    A) hematuria  palpable mass right lower quadrant  hx of high-grade urothelial carcinoma, with CIS  hydronephrosis to the level of the mass  4 mm lung nodule    P) Medical and cardiac clearance for general anesthesia  irrigate Arteaga q shift and prn with 100 cc of sterile water.  trend Hb transfuse as needed  trend creatinine  will schedule cystoscopy with bladder biopsy and TURBT when medically optimal  d/w attending 77 y/o f with gross hematuria, most likely recurrence of her bladder cancer.    A) hematuria  palpable mass right lower quadrant  hx of high-grade urothelial carcinoma, with CIS  hydronephrosis to the level of the mass  4 mm lung nodule    P) Medical and cardiac clearance for general anesthesia  consider IR consult for right nephrostomy tube  irrigate Arteaga q shift and prn with 100 cc of sterile water.  trend Hb transfuse as needed  trend creatinine  will schedule cystoscopy with bladder biopsy and TURBT when medically optimal  d/w attending 79 y/o f with gross hematuria, most likely recurrence of her bladder cancer.    A) hematuria  palpable mass right lower quadrant  hx of high-grade urothelial carcinoma, with CIS  hydronephrosis to the level of the mass  4 mm lung nodule    P) Medical and cardiac clearance for general anesthesia  consider IR consult for right nephrostomy tube  irrigate Arteaga q shift and prn with 100 cc of sterile water.  trend Hb transfuse as needed  trend creatinine  will schedule cystoscopy with bladder biopsy and TURBT when medically optimal  d/w attending    pt seen on morning rounds 07/11/2023  please se my note there

## 2023-07-10 NOTE — ED PROVIDER NOTE - PROGRESS NOTE DETAILS
Arteaga catheter flushed, significant amount of sediments removed, Artaega catheter working well -CD spoke to urology, will see patient -CD

## 2023-07-10 NOTE — ED ADULT NURSE NOTE - NSFALLHARMRISKINTERV_ED_ALL_ED

## 2023-07-11 LAB
A1C WITH ESTIMATED AVERAGE GLUCOSE RESULT: 5.4 % — SIGNIFICANT CHANGE UP (ref 4–5.6)
ALBUMIN SERPL ELPH-MCNC: 3.5 G/DL — SIGNIFICANT CHANGE UP (ref 3.5–5.2)
ALP SERPL-CCNC: 55 U/L — SIGNIFICANT CHANGE UP (ref 30–115)
ALT FLD-CCNC: 12 U/L — SIGNIFICANT CHANGE UP (ref 0–41)
ANION GAP SERPL CALC-SCNC: 12 MMOL/L — SIGNIFICANT CHANGE UP (ref 7–14)
APTT BLD: 27.2 SEC — SIGNIFICANT CHANGE UP (ref 27–39.2)
AST SERPL-CCNC: 14 U/L — SIGNIFICANT CHANGE UP (ref 0–41)
BASOPHILS # BLD AUTO: 0.03 K/UL — SIGNIFICANT CHANGE UP (ref 0–0.2)
BASOPHILS NFR BLD AUTO: 0.3 % — SIGNIFICANT CHANGE UP (ref 0–1)
BILIRUB SERPL-MCNC: 0.6 MG/DL — SIGNIFICANT CHANGE UP (ref 0.2–1.2)
BUN SERPL-MCNC: 27 MG/DL — HIGH (ref 10–20)
CALCIUM SERPL-MCNC: 9.2 MG/DL — SIGNIFICANT CHANGE UP (ref 8.4–10.5)
CHLORIDE SERPL-SCNC: 106 MMOL/L — SIGNIFICANT CHANGE UP (ref 98–110)
CHOLEST SERPL-MCNC: 211 MG/DL — HIGH
CO2 SERPL-SCNC: 25 MMOL/L — SIGNIFICANT CHANGE UP (ref 17–32)
CREAT SERPL-MCNC: 1.1 MG/DL — SIGNIFICANT CHANGE UP (ref 0.7–1.5)
CULTURE RESULTS: SIGNIFICANT CHANGE UP
EGFR: 51 ML/MIN/1.73M2 — LOW
EOSINOPHIL # BLD AUTO: 0.15 K/UL — SIGNIFICANT CHANGE UP (ref 0–0.7)
EOSINOPHIL NFR BLD AUTO: 1.4 % — SIGNIFICANT CHANGE UP (ref 0–8)
ESTIMATED AVERAGE GLUCOSE: 108 MG/DL — SIGNIFICANT CHANGE UP (ref 68–114)
GGT SERPL-CCNC: 15 U/L — SIGNIFICANT CHANGE UP (ref 1–40)
GLUCOSE SERPL-MCNC: 94 MG/DL — SIGNIFICANT CHANGE UP (ref 70–99)
HCT VFR BLD CALC: 37.8 % — SIGNIFICANT CHANGE UP (ref 37–47)
HDLC SERPL-MCNC: 59 MG/DL — SIGNIFICANT CHANGE UP
HGB BLD-MCNC: 12.4 G/DL — SIGNIFICANT CHANGE UP (ref 12–16)
IMM GRANULOCYTES NFR BLD AUTO: 0.5 % — HIGH (ref 0.1–0.3)
INR BLD: 0.97 RATIO — SIGNIFICANT CHANGE UP (ref 0.65–1.3)
LIPID PNL WITH DIRECT LDL SERPL: 133 MG/DL — HIGH
LYMPHOCYTES # BLD AUTO: 1.57 K/UL — SIGNIFICANT CHANGE UP (ref 1.2–3.4)
LYMPHOCYTES # BLD AUTO: 14.2 % — LOW (ref 20.5–51.1)
MAGNESIUM SERPL-MCNC: 1.8 MG/DL — SIGNIFICANT CHANGE UP (ref 1.8–2.4)
MCHC RBC-ENTMCNC: 28.1 PG — SIGNIFICANT CHANGE UP (ref 27–31)
MCHC RBC-ENTMCNC: 32.8 G/DL — SIGNIFICANT CHANGE UP (ref 32–37)
MCV RBC AUTO: 85.5 FL — SIGNIFICANT CHANGE UP (ref 81–99)
MONOCYTES # BLD AUTO: 0.92 K/UL — HIGH (ref 0.1–0.6)
MONOCYTES NFR BLD AUTO: 8.3 % — SIGNIFICANT CHANGE UP (ref 1.7–9.3)
NEUTROPHILS # BLD AUTO: 8.29 K/UL — HIGH (ref 1.4–6.5)
NEUTROPHILS NFR BLD AUTO: 75.3 % — HIGH (ref 42.2–75.2)
NON HDL CHOLESTEROL: 152 MG/DL — HIGH
NRBC # BLD: 0 /100 WBCS — SIGNIFICANT CHANGE UP (ref 0–0)
PLATELET # BLD AUTO: 245 K/UL — SIGNIFICANT CHANGE UP (ref 130–400)
PMV BLD: 9.7 FL — SIGNIFICANT CHANGE UP (ref 7.4–10.4)
POTASSIUM SERPL-MCNC: 3.7 MMOL/L — SIGNIFICANT CHANGE UP (ref 3.5–5)
POTASSIUM SERPL-SCNC: 3.7 MMOL/L — SIGNIFICANT CHANGE UP (ref 3.5–5)
PROT SERPL-MCNC: 5.2 G/DL — LOW (ref 6–8)
PROTHROM AB SERPL-ACNC: 11 SEC — SIGNIFICANT CHANGE UP (ref 9.95–12.87)
RBC # BLD: 4.42 M/UL — SIGNIFICANT CHANGE UP (ref 4.2–5.4)
RBC # FLD: 14.1 % — SIGNIFICANT CHANGE UP (ref 11.5–14.5)
SODIUM SERPL-SCNC: 143 MMOL/L — SIGNIFICANT CHANGE UP (ref 135–146)
SPECIMEN SOURCE: SIGNIFICANT CHANGE UP
TRIGL SERPL-MCNC: 99 MG/DL — SIGNIFICANT CHANGE UP
WBC # BLD: 11.02 K/UL — HIGH (ref 4.8–10.8)
WBC # FLD AUTO: 11.02 K/UL — HIGH (ref 4.8–10.8)

## 2023-07-11 PROCEDURE — 99222 1ST HOSP IP/OBS MODERATE 55: CPT

## 2023-07-11 PROCEDURE — 99448 NTRPROF PH1/NTRNET/EHR 21-30: CPT

## 2023-07-11 PROCEDURE — 99232 SBSQ HOSP IP/OBS MODERATE 35: CPT

## 2023-07-11 PROCEDURE — 99223 1ST HOSP IP/OBS HIGH 75: CPT

## 2023-07-11 RX ORDER — ACETAMINOPHEN 500 MG
650 TABLET ORAL EVERY 6 HOURS
Refills: 0 | Status: DISCONTINUED | OUTPATIENT
Start: 2023-07-11 | End: 2023-07-17

## 2023-07-11 RX ADMIN — CEFTRIAXONE 100 MILLIGRAM(S): 500 INJECTION, POWDER, FOR SOLUTION INTRAMUSCULAR; INTRAVENOUS at 23:05

## 2023-07-11 RX ADMIN — Medication 650 MILLIGRAM(S): at 20:45

## 2023-07-11 RX ADMIN — Medication 650 MILLIGRAM(S): at 04:30

## 2023-07-11 RX ADMIN — Medication 650 MILLIGRAM(S): at 21:44

## 2023-07-11 RX ADMIN — SODIUM CHLORIDE 75 MILLILITER(S): 9 INJECTION, SOLUTION INTRAVENOUS at 08:38

## 2023-07-11 NOTE — PROGRESS NOTE ADULT - ASSESSMENT
78-year-old female with history of bladder cancer (Diagnosed 10 years ago, s/p Intravesical therapy, No chemo or radiation, in remission), HTN and HLD presenting for Hematuria and decreased urine output. Imaging showing likely recurrence of bladder CA.    78-year-old female with history of bladder cancer (Diagnosed 10 years ago, s/p Intravesical therapy, No chemo or radiation, in remission), HTN and HLD presenting for Hematuria and decreased urine output. At baseline, patient is independent in ambulation.    #Recurrence of bladder cancer w/ concern for mets  #Hematuria  #Severe Right hydronephrosis  CT A&P shows large infiltrative soft tissue mass in the pelvis involving bladder and appears to invade the uterus. Severe right sided hydronephrosis; adrenal nodule, splenic hypodensities and pulmonary nodule  Urology eval appreciated. Dw uro - plan for cystoscopy, biopy/TURBT after medically optimized. Recommend IR guided nephrostomy for right sided hydro      Per ACC guidelines, pt would be low risk for moderate risk procedure. Further cardiac workup is not indicated  -IR f/u for nephrostomy  -Onc eval once biopsy results, possibly outpatient  -Active T&S, CBC BID for now  -Bladder irrigation as per urology  - Cont IV ceftriaxone 1g q24h    #Extra and intrahepatic dilation  -No symptoms, LFTs wnl  -Fu GGT and RUQ sono    #CKD 3b- stable- Cr from 2022 stable  #HTN  - On Lisinopril and HCTZ at home  - Held due to IV contrast. Resume lisinopril if renal function stable per BP    DVT PPX, SCD    #Progress Note Handoff  Pending (specify): IR f/u for nephrostomy, urology for cystoscopy  Family discussion: dw pt regarding eval for neprhostomy tube  Disposition: Home

## 2023-07-11 NOTE — CONSULT NOTE ADULT - SUBJECTIVE AND OBJECTIVE BOX
Outpt cardiologist:    Reason for Consult:     HISTORY OF PRESENT ILLNESS:  78-year-old female with history of bladder cancer (Diagnosed 10 years ago, s/p Intravesical therapy, No chemo or radiation, in remission), HTN and HLD presenting for Hematuria and decreased urine output. At baseline, patient is independent in ambulation.    Patient was in her usual state of health 1 year ago when she started having occasional on and off hematuria, it was not bothering her. 2 days ago patient had decreased urine output for which she presented to ED, gutierrez catheter was placed which drained bloody urine, patient was discharged but 1 day later the gutierrez was clogged and patient presented to ED. The gutierrez was flushed and bloody urine was drained with concern for clots. Denies HA, dizziness, NVD, fever, SOB, chest pain, abdominal pain, change in urination and change in bowel habits. Of note, patient endorses intentional 45 lbs weight loss since 2 years and lower back pain since 3 months.    In the ED, CT A&P shows 6.3 cm bladder mass invading the uterus and right ureter with hydronephrosis, 1.6cm adrenal nodule, 4mm right middle lobe lung nodule, splenic hypodensity and intra and extra hepatic biliary dilation. WBC 13K, no signs of infection.  Vital Signs Last 24 Hrs:  T(F): 96.5 (10 Jul 2023 16:21), Max: 97.6 (10 Jul 2023 10:11)  HR: 108 (10 Jul 2023 16:21) (108 - 109)  BP: 124/69 (10 Jul 2023 16:21) (124/69 - 126/83)  RR: 18 (10 Jul 2023 16:21) (18 - 18)  SpO2: 98% (10 Jul 2023 16:21) (98% - 99%) on RA     (10 Jul 2023 16:17)      Cardiology Fellow Notes    Previous Cardiac Workup    Risk Factors:      PAST MEDICAL & SURGICAL HISTORY  Bladder cancer    Hypertension    Hyperlipidemia        FAMILY HISTORY:  FAMILY HISTORY:      SOCIAL HISTORY:  Social History:      ALLERGIES:  No Known Allergies      MEDICATIONS:  cefTRIAXone   IVPB 1000 milliGRAM(s) IV Intermittent every 24 hours  cefTRIAXone   IVPB      lactated ringers. 1000 milliLiter(s) (75 mL/Hr) IV Continuous <Continuous>  polyethylene glycol 3350 17 Gram(s) Oral daily  senna 2 Tablet(s) Oral at bedtime    PRN:  acetaminophen     Tablet .. 650 milliGRAM(s) Oral every 6 hours PRN      HOME MEDICATIONS:  Home Medications:  lisinopril-hydrochlorothiazide 10 mg-12.5 mg oral tablet: 1 orally 2 times a day (10 Jul 2023 16:28)      VITALS:   T(F): 97 (07-11 @ 19:57), Max: 98.8 (07-11 @ 06:04)  HR: 96 (07-11 @ 19:57) (55 - 109)  BP: 140/92 (07-11 @ 19:57) (124/69 - 140/103)  BP(mean): --  RR: 18 (07-11 @ 19:57) (18 - 19)  SpO2: 98% (07-11 @ 19:57) (98% - 99%)    I&O's Summary      REVIEW OF SYSTEMS:  CONSTITUTIONAL: No weakness, fevers or chills  HEENT: No visual changes, neck/ear pain  RESPIRATORY: No cough, sob  CARDIOVASCULAR: See HPI  GASTROINTESTINAL: No abdominal pain. No nausea, vomiting, diarrhea   GENITOURINARY: No dysuria, frequency or hematuria  NEUROLOGICAL: No new focal deficits  SKIN: No new rashes    PHYSICAL EXAM:  General: Not in distress.  Non-toxic appearing.   Cardio: regular, S1, S2, no murmur  Pulm: B/L BS.  No wheezing / crackles / rales  Abdomen: Soft, non-tender, non-distended. Normoactive bowel sounds. Gutierrez in place.   Extremities: No edema b/l le  Neuro: A&O x3. No focal deficits    LABS:                        12.4   11.02 )-----------( 245      ( 11 Jul 2023 05:27 )             37.8     07-11    143  |  106  |  27<H>  ----------------------------<  94  3.7   |  25  |  1.1    Ca    9.2      11 Jul 2023 05:27  Mg     1.8     07-11    TPro  5.2<L>  /  Alb  3.5  /  TBili  0.6  /  DBili  x   /  AST  14  /  ALT  12  /  AlkPhos  55  07-11    PT/INR - ( 11 Jul 2023 05:27 )   PT: 11.00 sec;   INR: 0.97 ratio         PTT - ( 11 Jul 2023 05:27 )  PTT:27.2 sec          Troponin trend:      07-11 Chol 211<H> LDL -- HDL 59 Trig 99    ECG:  NSR  No ischemic changes        HISTORY OF PRESENT ILLNESS:  78-year-old female with history of bladder cancer (Diagnosed 10 years ago, s/p Intravesical therapy, No chemo or radiation, in remission), HTN and HLD presenting for Hematuria and decreased urine output. At baseline, patient is independent in ambulation.    Patient was in her usual state of health 1 year ago when she started having occasional on and off hematuria, it was not bothering her. 2 days ago patient had decreased urine output for which she presented to ED, gutierrez catheter was placed which drained bloody urine, patient was discharged but 1 day later the gutierrez was clogged and patient presented to ED. The gutierrez was flushed and bloody urine was drained with concern for clots. Denies HA, dizziness, NVD, fever, SOB, chest pain, abdominal pain, change in urination and change in bowel habits. Of note, patient endorses intentional 45 lbs weight loss since 2 years and lower back pain since 3 months.      PAST MEDICAL & SURGICAL HISTORY  Bladder cancer    Hypertension    Hyperlipidemia      ALLERGIES:  No Known Allergies      MEDICATIONS:  cefTRIAXone   IVPB 1000 milliGRAM(s) IV Intermittent every 24 hours  cefTRIAXone   IVPB      lactated ringers. 1000 milliLiter(s) (75 mL/Hr) IV Continuous <Continuous>  polyethylene glycol 3350 17 Gram(s) Oral daily  senna 2 Tablet(s) Oral at bedtime    PRN:  acetaminophen     Tablet .. 650 milliGRAM(s) Oral every 6 hours PRN      HOME MEDICATIONS:  Home Medications:  lisinopril-hydrochlorothiazide 10 mg-12.5 mg oral tablet: 1 orally 2 times a day (10 Jul 2023 16:28)      VITALS:   T(F): 97 (07-11 @ 19:57), Max: 98.8 (07-11 @ 06:04)  HR: 96 (07-11 @ 19:57) (55 - 109)  BP: 140/92 (07-11 @ 19:57) (124/69 - 140/103)  BP(mean): --  RR: 18 (07-11 @ 19:57) (18 - 19)  SpO2: 98% (07-11 @ 19:57) (98% - 99%)      REVIEW OF SYSTEMS:  CONSTITUTIONAL: No fever, weight loss, fatigue  NECK: No pain or stiffness  RESPIRATORY: See HPI  CARDIOVASCULAR: See HPI  GASTROINTESTINAL: No abdominal/epigastric pain, nausea, vomiting, hematemesis, diarrhea, constipation, melena or hematochezia  GENITOURINARY: See HPI  NEUROLOGICAL: No headaches, memory loss, loss of strength, numbness, tremors  SKIN: No itching, burning, rashes, lesions   ENDOCRINE: No heat/cold intolerance or hair loss  MUSCULOSKELETAL: No joint pain or swelling  HEME/LYMPH: No easy bruising or bleeding gums    PHYSICAL EXAM:  General: Not in distress.  Non-toxic appearing.   Cardio: regular, S1, S2, no murmur  Pulm: B/L BS.  No wheezing / crackles / rales  Abdomen: Soft, non-tender, non-distended, gutierrez in place.   Extremities: No edema b/l le  Neuro: A&O x3. No focal deficits      LABS:                        12.4   11.02 )-----------( 245      ( 11 Jul 2023 05:27 )             37.8     07-11    143  |  106  |  27<H>  ----------------------------<  94  3.7   |  25  |  1.1    Ca    9.2      11 Jul 2023 05:27  Mg     1.8     07-11    TPro  5.2<L>  /  Alb  3.5  /  TBili  0.6  /  DBili  x   /  AST  14  /  ALT  12  /  AlkPhos  55  07-11    PT/INR - ( 11 Jul 2023 05:27 )   PT: 11.00 sec;   INR: 0.97 ratio      PTT - ( 11 Jul 2023 05:27 )  PTT:27.2 sec      ECG:  NSR, no ischemic changes

## 2023-07-11 NOTE — PROGRESS NOTE ADULT - ASSESSMENT
Pt is a 78y Female with history of high grade urothelial ca now found to have R hydro to the level of a large pelvic mass.    PLAN:  Patient seen and examined with Dr. Horne, plan as per attending below:  -Medical & cardiac risk stratification for general anesthesia for cystoscopy, bladder biopsy  -IR consulted -- do not recommend PCN  -Continue gutierrez catheter -- draining clear yellow urine  -UA noted, f/u UCx -- on Rocephin  -Hgb stable, Cr 1.1 today  -Remainder of care as per primary team Pt is a 78y Female with history of high grade urothelial ca now found to have R hydro to the level of a large pelvic mass.    PLAN:  Patient seen and examined with Dr. Horne, plan as per attending below:  -Medical & cardiac risk stratification for general anesthesia for cystoscopy, bladder biopsy  -IR consulted -- do not recommend PCN  -Continue gutierrez catheter -- draining clear yellow urine  -UA noted, f/u UCx -- on Rocephin  -Hgb stable, Cr 1.1 today  -Remainder of care as per primary team    i discussed the CT findings and probable dx with her and her granddaughter  i reviewed that the plan will depend on the pathology which will be determined by TURBT once medically optimized  she had a lot of questions re the medical optimization and i directed those questions to the medicla team    as far as the pcn i am not sure why that is needed we will see what IR and dr calderon have to say

## 2023-07-11 NOTE — CONSULT NOTE ADULT - SUBJECTIVE AND OBJECTIVE BOX
INTERVENTIONAL RADIOLOGY CONSULT:     Procedure Requested: R sided nephrostomy tube placement    HPI:  78-year-old female with history of bladder cancer (Diagnosed 10 years ago, s/p Intravesical therapy, No chemo or radiation, in remission), HTN and HLD presenting for Hematuria and decreased urine output. At baseline, patient is independent in ambulation.    Patient was in her usual state of health 1 year ago when she started having occasional on and off hematuria, it was not bothering her. 2 days ago patient had decreased urine output for which she presented to ED, gutierrez catheter was placed which drained bloody urine, patient was discharged but 1 day later the gutierrez was clogged and patient presented to ED. The gutierrez was flushed and bloody urine was drained with concern for clots. Denies HA, dizziness, NVD, fever, SOB, chest pain, abdominal pain, change in urination and change in bowel habits. Of note, patient endorses intentional 45 lbs weight loss since 2 years and lower back pain since 3 months.    In the ED, CT A&P shows 6.3 cm bladder mass invading the uterus and right ureter with hydronephrosis, 1.6cm adrenal nodule, 4mm right middle lobe lung nodule, splenic hypodensity and intra and extra hepatic biliary dilation. WBC 13K, no signs of infection.  Vital Signs Last 24 Hrs:  T(F): 96.5 (10 Jul 2023 16:21), Max: 97.6 (10 Jul 2023 10:11)  HR: 108 (10 Jul 2023 16:21) (108 - 109)  BP: 124/69 (10 Jul 2023 16:21) (124/69 - 126/83)  RR: 18 (10 Jul 2023 16:21) (18 - 18)  SpO2: 98% (10 Jul 2023 16:21) (98% - 99%) on RA     (10 Jul 2023 16:17)      PAST MEDICAL & SURGICAL HISTORY:  Bladder cancer      Hypertension      Hyperlipidemia          MEDICATIONS  (STANDING):  cefTRIAXone   IVPB 1000 milliGRAM(s) IV Intermittent every 24 hours  cefTRIAXone   IVPB      lactated ringers. 1000 milliLiter(s) (75 mL/Hr) IV Continuous <Continuous>  polyethylene glycol 3350 17 Gram(s) Oral daily  senna 2 Tablet(s) Oral at bedtime    MEDICATIONS  (PRN):  acetaminophen     Tablet .. 650 milliGRAM(s) Oral every 6 hours PRN Mild Pain (1 - 3), Moderate Pain (4 - 6)      Allergies    No Known Allergies    Intolerances        Social History:   Smoking: Yes [ ]  No [ ]   ______pk yrs  ETOH  Yes [ ]  No [ ]  Social [ ]  DRUGS:  Yes [ ]  No [ ]  if so what______________    FAMILY HISTORY:      Physical Exam:   Vital Signs Last 24 Hrs  T(C): 36.6 (11 Jul 2023 07:37), Max: 37.1 (11 Jul 2023 06:04)  T(F): 97.8 (11 Jul 2023 07:37), Max: 98.8 (11 Jul 2023 06:04)  HR: 55 (11 Jul 2023 07:37) (55 - 108)  BP: 138/64 (11 Jul 2023 07:37) (124/69 - 138/64)  BP(mean): --  RR: 18 (11 Jul 2023 07:37) (18 - 19)  SpO2: 99% (11 Jul 2023 07:37) (98% - 99%)    Parameters below as of 11 Jul 2023 07:37  Patient On (Oxygen Delivery Method): room air      Labs:                         12.4   11.02 )-----------( 245      ( 11 Jul 2023 05:27 )             37.8     07-11    143  |  106  |  27<H>  ----------------------------<  94  3.7   |  25  |  1.1    Ca    9.2      11 Jul 2023 05:27  Mg     1.8     07-11    TPro  5.2<L>  /  Alb  3.5  /  TBili  0.6  /  DBili  x   /  AST  14  /  ALT  12  /  AlkPhos  55  07-11    PT/INR - ( 11 Jul 2023 05:27 )   PT: 11.00 sec;   INR: 0.97 ratio         PTT - ( 11 Jul 2023 05:27 )  PTT:27.2 sec    Pertinent labs:                      12.4   11.02 )-----------( 245      ( 11 Jul 2023 05:27 )             37.8       07-11    143  |  106  |  27<H>  ----------------------------<  94  3.7   |  25  |  1.1    Ca    9.2      11 Jul 2023 05:27  Mg     1.8     07-11    TPro  5.2<L>  /  Alb  3.5  /  TBili  0.6  /  DBili  x   /  AST  14  /  ALT  12  /  AlkPhos  55  07-11      PT/INR - ( 11 Jul 2023 05:27 )   PT: 11.00 sec;   INR: 0.97 ratio         PTT - ( 11 Jul 2023 05:27 )  PTT:27.2 sec    Radiology & Additional Studies:     IMPRESSION:    Limited assessment of pelvicorgans due to extensive artifact from right   hip prosthesis. Large infiltrative soft tissue mass in the pelvis   involving the bladder and appears to invade the uterus, measuring at   least 6.3 cm. Severe right-sided hydroureteronephrosis to the level of   the distal ureter which appears to be involved by this infiltrating mass   as well. There appears to be a Gutierrez catheter in the bladder. Additional   areas of nodularity in the bladder likely representing additional lesions.    Intrahepatic and extrahepatic biliary ductal dilatation. While this could   be seen post cholecystectomy, recommend clinical correlation including   with LFTs. No prior imaging available for comparison.    4 mm right middle lobe pulmonary nodule. Follow-up as per oncology   protocol.    Indeterminate 1.6 cm right adrenal nodule. Correlate with prior imaging.    Indeterminate splenic hypodensities. Correlate with prior imaging.    --- End of Report ---      Radiology imaging reviewed.       ASSESSMENT/ PLAN:   78-year-old female with history of bladder cancer (Diagnosed 10 years ago, s/p Intravesical therapy, No chemo or radiation, in remission), HTN and HLD presenting for Hematuria and decreased urine output. CT imaging confirms severe right sided hydroureteronephrosis to the level of the distal ureter which appears to be involved by this infiltrating mass as well. IR consulted for R sided nephrostomy tube placement.   - current WBC 11.02, downtrending from 14.72  - current Cr 1.1 downtrending from 1.3  - hydro noted on CT, patient clinically improving overall  - would not recommend IR intervention at this time  - if patient clinically worsens may re-consult      Thank you for the courtesy of this consult, please call [phone] with any further questions.

## 2023-07-11 NOTE — CONSULT NOTE ADULT - ATTENDING COMMENTS
Low-risk procedure  Moderate-risk for perioperative MACE    No further cardiac workup is indicated at this time.  There are no current cardiac contraindications that prohibit proceeding with the scheduled surgery/procedure.  This consult serves only as a perioperative cardiac risk stratification and evaluation to predict 30-day cardiac complications risk and mortality.  The decision to proceed with the surgery/procedure is made by the performing physician and the patient.

## 2023-07-11 NOTE — CONSULT NOTE ADULT - ASSESSMENT
Impression   # Hematuria with clots - Planned  for cystoscopy   # CKD, HTN, DLD    No known cardiac history   + CAC on CT     No angina or ACS   No evidence of unstable arrhythmia   No evidence of significant valvular disease   No ADHF    RCRI 0 -> 3.9% risk of perioperative MI   METS < 4      Recommendations   - She is currently optimized for procedure   - No further cardiac workup   - She is at moderate risk for perioperative MACE   - This consult serves only as a mirlande-operative cardiac risk stratification and evaluation to predict 30-days cardiac complications risk and mortality. The decision to proceed with the surgery/procedure is made by the performing physician and the patient -   Impression   # Hematuria with clots - Planned  for cystoscopy   # CKD, HTN, DLD    No known cardiac history   + CAC on CT     No angina or ACS   No evidence of unstable arrhythmia   No evidence of significant valvular disease   No ADHF    RCRI 0 -> 3.9% risk of perioperative MI   METS < 4      Recommendations   - She is currently optimized for procedure   - No further cardiac workup   - She is at moderate risk for perioperative MACE

## 2023-07-11 NOTE — PROGRESS NOTE ADULT - SUBJECTIVE AND OBJECTIVE BOX
JOSSE VIRGINIA  78y, Female  Allergy: No Known Allergies    Hospital Day: 1d    Patient seen and examined. No acute events overnight    PMH/PSH:  PAST MEDICAL & SURGICAL HISTORY:  Bladder cancer      Hypertension      Hyperlipidemia          VITALS:  T(F): 97.8 (07-11-23 @ 07:37), Max: 98.8 (07-11-23 @ 06:04)  HR: 55 (07-11-23 @ 07:37)  BP: 138/64 (07-11-23 @ 07:37) (124/69 - 138/64)  RR: 18 (07-11-23 @ 07:37)  SpO2: 99% (07-11-23 @ 07:37)    TESTS & MEASUREMENTS:  Weight (Kg): 59.9 (07-10-23 @ 10:11), 59.9 (07-09-23 @ 18:15)  BMI (kg/m2): 24.1 (07-10)                          12.4   11.02 )-----------( 245      ( 11 Jul 2023 05:27 )             37.8     PT/INR - ( 11 Jul 2023 05:27 )   PT: 11.00 sec;   INR: 0.97 ratio         PTT - ( 11 Jul 2023 05:27 )  PTT:27.2 sec  07-11    143  |  106  |  27<H>  ----------------------------<  94  3.7   |  25  |  1.1    Ca    9.2      11 Jul 2023 05:27  Mg     1.8     07-11    TPro  5.2<L>  /  Alb  3.5  /  TBili  0.6  /  DBili  x   /  AST  14  /  ALT  12  /  AlkPhos  55  07-11    LIVER FUNCTIONS - ( 11 Jul 2023 05:27 )  Alb: 3.5 g/dL / Pro: 5.2 g/dL / ALK PHOS: 55 U/L / ALT: 12 U/L / AST: 14 U/L / GGT: 15 U/L             Urinalysis Basic - ( 11 Jul 2023 05:27 )    Color: x / Appearance: x / SG: x / pH: x  Gluc: 94 mg/dL / Ketone: x  / Bili: x / Urobili: x   Blood: x / Protein: x / Nitrite: x   Leuk Esterase: x / RBC: x / WBC x   Sq Epi: x / Non Sq Epi: x / Bacteria: x        RADIOLOGY & ADDITIONAL TESTS:    RECENT DIAGNOSTIC ORDERS:  Magnesium: AM Sched. Collection: 12-Jul-2023 04:30 (07-11-23 @ 09:30)  Complete Blood Count + Automated Diff: AM Sched. Collection: 12-Jul-2023 04:30 (07-11-23 @ 09:30)  Basic Metabolic Panel: AM Sched. Collection: 12-Jul-2023 04:30 (07-11-23 @ 09:30)  US Abdomen Upper Quadrant Right: Routine   Indication: biliary dialtion eval  Transport: Stret[facility] (07-10-23 @ 16:50)  Culture - Urine: Routine  Specimen Source: Catheterized  Addl Info: If indwelling urinary catheter > 14 days, obtain an order to remove and replace prior to c (07-10-23 @ 16:50)  Diet, NPO after Midnight:      NPO Start Date: 10-Jul-2023,   NPO Start Time: 23:59 (07-10-23 @ 16:50)  Diet, DASH/TLC:   Sodium & Cholesterol Restricted (07-10-23 @ 16:50)  12 Lead ECG (07-10-23 @ 16:50)  A1C with Estimated Average Glucose: AM Sched. Collection: 11-Jul-2023 04:30 (07-10-23 @ 16:50)      MEDICATIONS:  MEDICATIONS  (STANDING):  cefTRIAXone   IVPB 1000 milliGRAM(s) IV Intermittent every 24 hours  cefTRIAXone   IVPB      lactated ringers. 1000 milliLiter(s) (75 mL/Hr) IV Continuous <Continuous>  polyethylene glycol 3350 17 Gram(s) Oral daily  senna 2 Tablet(s) Oral at bedtime    MEDICATIONS  (PRN):  acetaminophen     Tablet .. 650 milliGRAM(s) Oral every 6 hours PRN Mild Pain (1 - 3), Moderate Pain (4 - 6)      HOME MEDICATIONS:  lisinopril-hydrochlorothiazide 10 mg-12.5 mg oral tablet (07-10)      REVIEW OF SYSTEMS:  All other review of systems is negative unless indicated above.     PHYSICAL EXAM:  PHYSICAL EXAM:  GENERAL: NAD, well-developed  HEAD:  Atraumatic, Normocephalic  NECK: Supple, No JVD  CHEST/LUNG: Clear to auscultation bilaterally; No wheeze  HEART: Regular rate and rhythm; No murmurs, rubs, or gallops  ABDOMEN: Soft, Nontender, Nondistended; Bowel sounds present  EXTREMITIES:  2+ Peripheral Pulses, No clubbing, cyanosis, or edema  SKIN: No rashes or lesions

## 2023-07-12 LAB
ANION GAP SERPL CALC-SCNC: 12 MMOL/L — SIGNIFICANT CHANGE UP (ref 7–14)
APTT BLD: 27.3 SEC — SIGNIFICANT CHANGE UP (ref 27–39.2)
BASOPHILS # BLD AUTO: 0.05 K/UL — SIGNIFICANT CHANGE UP (ref 0–0.2)
BASOPHILS NFR BLD AUTO: 0.5 % — SIGNIFICANT CHANGE UP (ref 0–1)
BUN SERPL-MCNC: 22 MG/DL — HIGH (ref 10–20)
CALCIUM SERPL-MCNC: 9 MG/DL — SIGNIFICANT CHANGE UP (ref 8.4–10.4)
CHLORIDE SERPL-SCNC: 106 MMOL/L — SIGNIFICANT CHANGE UP (ref 98–110)
CO2 SERPL-SCNC: 23 MMOL/L — SIGNIFICANT CHANGE UP (ref 17–32)
CREAT SERPL-MCNC: 1 MG/DL — SIGNIFICANT CHANGE UP (ref 0.7–1.5)
EGFR: 57 ML/MIN/1.73M2 — LOW
EOSINOPHIL # BLD AUTO: 0.21 K/UL — SIGNIFICANT CHANGE UP (ref 0–0.7)
EOSINOPHIL NFR BLD AUTO: 2.2 % — SIGNIFICANT CHANGE UP (ref 0–8)
GLUCOSE SERPL-MCNC: 98 MG/DL — SIGNIFICANT CHANGE UP (ref 70–99)
HCT VFR BLD CALC: 38 % — SIGNIFICANT CHANGE UP (ref 37–47)
HGB BLD-MCNC: 12.6 G/DL — SIGNIFICANT CHANGE UP (ref 12–16)
IMM GRANULOCYTES NFR BLD AUTO: 0.6 % — HIGH (ref 0.1–0.3)
INR BLD: 0.98 RATIO — SIGNIFICANT CHANGE UP (ref 0.65–1.3)
LYMPHOCYTES # BLD AUTO: 1.29 K/UL — SIGNIFICANT CHANGE UP (ref 1.2–3.4)
LYMPHOCYTES # BLD AUTO: 13.7 % — LOW (ref 20.5–51.1)
MAGNESIUM SERPL-MCNC: 1.7 MG/DL — LOW (ref 1.8–2.4)
MCHC RBC-ENTMCNC: 28.4 PG — SIGNIFICANT CHANGE UP (ref 27–31)
MCHC RBC-ENTMCNC: 33.2 G/DL — SIGNIFICANT CHANGE UP (ref 32–37)
MCV RBC AUTO: 85.8 FL — SIGNIFICANT CHANGE UP (ref 81–99)
MONOCYTES # BLD AUTO: 0.7 K/UL — HIGH (ref 0.1–0.6)
MONOCYTES NFR BLD AUTO: 7.4 % — SIGNIFICANT CHANGE UP (ref 1.7–9.3)
NEUTROPHILS # BLD AUTO: 7.11 K/UL — HIGH (ref 1.4–6.5)
NEUTROPHILS NFR BLD AUTO: 75.6 % — HIGH (ref 42.2–75.2)
NRBC # BLD: 0 /100 WBCS — SIGNIFICANT CHANGE UP (ref 0–0)
PLATELET # BLD AUTO: 259 K/UL — SIGNIFICANT CHANGE UP (ref 130–400)
PMV BLD: 9.8 FL — SIGNIFICANT CHANGE UP (ref 7.4–10.4)
POTASSIUM SERPL-MCNC: 3.6 MMOL/L — SIGNIFICANT CHANGE UP (ref 3.5–5)
POTASSIUM SERPL-SCNC: 3.6 MMOL/L — SIGNIFICANT CHANGE UP (ref 3.5–5)
PROTHROM AB SERPL-ACNC: 11.2 SEC — SIGNIFICANT CHANGE UP (ref 9.95–12.87)
RBC # BLD: 4.43 M/UL — SIGNIFICANT CHANGE UP (ref 4.2–5.4)
RBC # FLD: 14.2 % — SIGNIFICANT CHANGE UP (ref 11.5–14.5)
SODIUM SERPL-SCNC: 141 MMOL/L — SIGNIFICANT CHANGE UP (ref 135–146)
WBC # BLD: 9.42 K/UL — SIGNIFICANT CHANGE UP (ref 4.8–10.8)
WBC # FLD AUTO: 9.42 K/UL — SIGNIFICANT CHANGE UP (ref 4.8–10.8)

## 2023-07-12 PROCEDURE — 99233 SBSQ HOSP IP/OBS HIGH 50: CPT

## 2023-07-12 PROCEDURE — 76705 ECHO EXAM OF ABDOMEN: CPT | Mod: 26

## 2023-07-12 RX ORDER — CHLORHEXIDINE GLUCONATE 213 G/1000ML
1 SOLUTION TOPICAL
Refills: 0 | Status: DISCONTINUED | OUTPATIENT
Start: 2023-07-12 | End: 2023-07-17

## 2023-07-12 RX ORDER — TAMSULOSIN HYDROCHLORIDE 0.4 MG/1
0.4 CAPSULE ORAL AT BEDTIME
Refills: 0 | Status: DISCONTINUED | OUTPATIENT
Start: 2023-07-12 | End: 2023-07-17

## 2023-07-12 RX ORDER — LIDOCAINE 4 G/100G
1 CREAM TOPICAL DAILY
Refills: 0 | Status: DISCONTINUED | OUTPATIENT
Start: 2023-07-12 | End: 2023-07-17

## 2023-07-12 RX ORDER — MAGNESIUM SULFATE 500 MG/ML
2 VIAL (ML) INJECTION ONCE
Refills: 0 | Status: COMPLETED | OUTPATIENT
Start: 2023-07-12 | End: 2023-07-12

## 2023-07-12 RX ADMIN — TAMSULOSIN HYDROCHLORIDE 0.4 MILLIGRAM(S): 0.4 CAPSULE ORAL at 21:12

## 2023-07-12 RX ADMIN — Medication 650 MILLIGRAM(S): at 22:00

## 2023-07-12 RX ADMIN — Medication 650 MILLIGRAM(S): at 09:03

## 2023-07-12 RX ADMIN — LIDOCAINE 1 PATCH: 4 CREAM TOPICAL at 22:48

## 2023-07-12 RX ADMIN — SODIUM CHLORIDE 75 MILLILITER(S): 9 INJECTION, SOLUTION INTRAVENOUS at 09:14

## 2023-07-12 RX ADMIN — CEFTRIAXONE 100 MILLIGRAM(S): 500 INJECTION, POWDER, FOR SOLUTION INTRAMUSCULAR; INTRAVENOUS at 21:12

## 2023-07-12 RX ADMIN — LIDOCAINE 1 PATCH: 4 CREAM TOPICAL at 20:21

## 2023-07-12 RX ADMIN — CHLORHEXIDINE GLUCONATE 1 APPLICATION(S): 213 SOLUTION TOPICAL at 21:14

## 2023-07-12 RX ADMIN — Medication 25 GRAM(S): at 12:34

## 2023-07-12 RX ADMIN — LIDOCAINE 1 PATCH: 4 CREAM TOPICAL at 12:34

## 2023-07-12 RX ADMIN — Medication 650 MILLIGRAM(S): at 08:33

## 2023-07-12 RX ADMIN — Medication 650 MILLIGRAM(S): at 21:11

## 2023-07-12 NOTE — PROGRESS NOTE ADULT - ATTENDING COMMENTS
as above    discussed with resident this morning     follow up appreciated    Patient is moderate risk for low risk procedure-concur with cardiology evaluation    care as outlined including antibiotics    cystoscopy pending

## 2023-07-12 NOTE — PROGRESS NOTE ADULT - ASSESSMENT
78-year-old female with history of bladder cancer (Diagnosed 10 years ago, s/p Intravesical therapy, No chemo or radiation, in remission), HTN and HLD presenting for Hematuria and decreased urine output. At baseline, patient is independent in ambulation. Imaging showing likely recurrence of bladder CA.    #Recurrence of bladder cancer w/ concern for mets  #Hematuria  #Severe Right hydronephrosis  - CT A&P shows large infiltrative soft tissue mass in the pelvis involving bladder and appears to invade the uterus. Severe right sided hydronephrosis; adrenal nodule, splenic hypodensities and pulmonary nodule  - Urology eval appreciated. d/w uro - plan for cystoscopy, biopsy/TURBT after medically optimized. Recommend IR guided nephrostomy for right sided hydro  - per cardio: pt would be low risk for moderate risk procedure. Further cardiac workup is not indicated  - IR f/u for nephrostomy - no IR intervention at this time  - Onc eval once biopsy results, possibly outpatient  - Active T&S, CBC BID for now  - Bladder irrigation as per urology  - Cont IV ceftriaxone 1g q24h    #Leucocytosis 2/2 Ca  - Monitor off Abx  - UA -ve for bacteria  - Urine Cx +ve E Coli - will need treatment, as urology will do intervention    #Extra and intrahepatic dilation  - No symptoms, LFTs wnl  - GGT WNL   - f/u RUQ sono    #CKD 3b- stable- Cr from 2022 stable  #HTN  - On Lisinopril and HCTZ at home  - Held due to IV contrast. Resume lisinopril if renal function stable per BP                                                                              ----------------------------------------------------  # DVT prophylaxis: SCD  # GI prophylaxis: Not indicated  # Diet: DASH/TLC  # Activity: AAT  # Code status: Full code  # Disposition:                                                                            --------------------------------------------------------    # Handoff:  78-year-old female with history of bladder cancer (Diagnosed 10 years ago, s/p Intravesical therapy, No chemo or radiation, in remission), HTN and HLD presenting for Hematuria and decreased urine output. At baseline, patient is independent in ambulation. Imaging showing likely recurrence of bladder CA.    #Recurrence of bladder cancer w/ concern for mets  #Hematuria  #Severe Right hydronephrosis  - CT A&P shows large infiltrative soft tissue mass in the pelvis involving bladder and appears to invade the uterus. Severe right sided hydronephrosis; adrenal nodule, splenic hypodensities and pulmonary nodule  - Urology eval appreciated. d/w uro - f/u uro for cystoscopy, biopsy/TURBT after medically optimized. Recommend IR guided nephrostomy for right sided hydro  - per cardio: pt would be low risk for moderate risk procedure. Further cardiac workup is not indicated  - IR f/u for nephrostomy - no IR intervention at this time  - Onc eval once biopsy results, possibly outpatient  - Active T&S, CBC BID for now  - Bladder irrigation as per urology  - Cont IV ceftriaxone 1g q24h    #Leucocytosis 2/2 Ca  - UA -ve for bacteria  - Urine Cx +ve E Coli  - c/w ceftriaxone 1g q24h    #Extra and intrahepatic dilation  - No symptoms, LFTs wnl  - GGT WNL     #CKD 3b- stable- Cr from 2022 stable  #HTN  - On Lisinopril and HCTZ at home  - Held due to IV contrast. Resume lisinopril if renal function stable per BP                                                                              ----------------------------------------------------  # DVT prophylaxis: SCD  # GI prophylaxis: Not indicated  # Diet: DASH/TLC  # Activity: AAT  # Code status: Full code  # Disposition:                                                                            --------------------------------------------------------    # Handoff:   - f/u urology for cystoscopy  - f/u medical clearance 78-year-old female with history of bladder cancer (Diagnosed 10 years ago, s/p Intravesical therapy, No chemo or radiation, in remission), HTN and HLD presenting for Hematuria and decreased urine output. At baseline, patient is independent in ambulation. Imaging showing likely recurrence of bladder CA.    #Recurrence of bladder cancer w/ concern for mets  #Hematuria  #Severe Right hydronephrosis  - CT A&P shows large infiltrative soft tissue mass in the pelvis involving bladder and appears to invade the uterus. Severe right sided hydronephrosis; adrenal nodule, splenic hypodensities and pulmonary nodule  - Urology eval appreciated. d/w uro - f/u uro for cystoscopy, biopsy/TURBT after medically optimized. Recommend IR guided nephrostomy for right sided hydro  - per cardio: pt would be low risk for moderate risk procedure. Further cardiac workup is not indicated  - IR f/u for nephrostomy - no IR intervention at this time  - Onc eval once biopsy results, possibly outpatient  - Active T&S, CBC BID for now  - Bladder irrigation as per urology  - Cont IV ceftriaxone 1g q24h  - gutierrez placed, TOV after cystoscopy  - started pt on flomax    #Leucocytosis 2/2 Ca  - UA -ve for bacteria  - Urine Cx +ve E Coli  - c/w ceftriaxone 1g q24h    #Extra and intrahepatic dilation  - No symptoms, LFTs wnl  - GGT WNL     #CKD 3b- stable- Cr from 2022 stable  #HTN  - On Lisinopril and HCTZ at home  - Held due to IV contrast. Resume lisinopril if renal function stable per BP                                                                              ----------------------------------------------------  # DVT prophylaxis: SCD  # GI prophylaxis: Not indicated  # Diet: DASH/TLC  # Activity: AAT  # Code status: Full code  # Disposition:                                                                            --------------------------------------------------------    # Handoff:   - f/u urology for cystoscopy  - f/u medical clearance

## 2023-07-12 NOTE — PROGRESS NOTE ADULT - SUBJECTIVE AND OBJECTIVE BOX
UROLOGY DAILY PROGRESS NOTE    79 y/o Female with extensive urologic history. 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 Patient seen and examined at bedside. Patient c/o mild abdominal pain. Denies any fever, chills, N/V, SOB/difficulty breathing, flank pain.     MEDICATIONS  (STANDING):  cefTRIAXone   IVPB 1000 milliGRAM(s) IV Intermittent every 24 hours  cefTRIAXone   IVPB      chlorhexidine 2% Cloths 1 Application(s) Topical <User Schedule>  lactated ringers. 1000 milliLiter(s) (75 mL/Hr) IV Continuous <Continuous>  lidocaine   4% Patch 1 Patch Transdermal daily  magnesium sulfate  IVPB 2 Gram(s) IV Intermittent once  polyethylene glycol 3350 17 Gram(s) Oral daily  senna 2 Tablet(s) Oral at bedtime  tamsulosin 0.4 milliGRAM(s) Oral at bedtime    MEDICATIONS  (PRN):  acetaminophen     Tablet .. 650 milliGRAM(s) Oral every 6 hours PRN Mild Pain (1 - 3), Moderate Pain (4 - 6)      REVIEW OF SYSTEMS   [x] A ten-point review of systems was otherwise negative except as noted.    Vital Signs Last 24 Hrs  T(C): 36.2 (12 Jul 2023 05:15), Max: 36.9 (11 Jul 2023 16:33)  T(F): 97.2 (12 Jul 2023 05:15), Max: 98.4 (11 Jul 2023 16:33)  HR: 57 (12 Jul 2023 05:15) (57 - 96)  BP: 112/54 (12 Jul 2023 05:15) (112/54 - 140/92)  RR: 18 (12 Jul 2023 05:15) (18 - 18)  SpO2: 99% (12 Jul 2023 05:15) (98% - 99%)    Parameters below as of 11 Jul 2023 16:33  Patient On (Oxygen Delivery Method): room air        PHYSICAL EXAM:    GEN: NAD, well-developed, awake and alert.  SKIN: Good color, non diaphoretic.  HEENT: NC/AT.  RESP: No dyspnea, non-labored breathing. No use of accessory muscles.  CARDIO: +S1/S2  ABDO: Soft, NT/ND, no palpable bladder, no suprapubic tenderness  BACK: No CVAT B/L  : + Indwelling gutierrez in place draining clear yellow urine w/ blood sediment, clearing in tubing       I&O's Summary      LABS:                        12.6   9.42  )-----------( 259      ( 12 Jul 2023 08:36 )             38.0     07-12    141  |  106  |  22<H>  ----------------------------<  98  3.6   |  23  |  1.0    Ca    9.0      12 Jul 2023 08:36  Mg     1.7     07-12    TPro  5.2<L>  /  Alb  3.5  /  TBili  0.6  /  DBili  x   /  AST  14  /  ALT  12  /  AlkPhos  55  07-11    PT/INR - ( 12 Jul 2023 08:36 )   PT: 11.20 sec;   INR: 0.98 ratio         PTT - ( 12 Jul 2023 08:36 )  PTT:27.3 sec  Urinalysis Basic - ( 12 Jul 2023 08:36 )    Color: x / Appearance: x / SG: x / pH: x  Gluc: 98 mg/dL / Ketone: x  / Bili: x / Urobili: x   Blood: x / Protein: x / Nitrite: x   Leuk Esterase: x / RBC: x / WBC x   Sq Epi: x / Non Sq Epi: x / Bacteria: x    Culture - Urine (07.10.23 @ 17:11)    Specimen Source: Catheterized Catheterized   Culture Results:   >100,000 CFU/ml Escherichia coli        RADIOLOGY & ADDITIONAL STUDIES:

## 2023-07-12 NOTE — PATIENT PROFILE ADULT - FALL HARM RISK - UNIVERSAL INTERVENTIONS
Bed in lowest position, wheels locked, appropriate side rails in place/Call bell, personal items and telephone in reach/Instruct patient to call for assistance before getting out of bed or chair/Non-slip footwear when patient is out of bed/[place] to call system/Physically safe environment - no spills, clutter or unnecessary equipment/Purposeful Proactive Rounding/Room/bathroom lighting operational, light cord in reach

## 2023-07-12 NOTE — PROGRESS NOTE ADULT - SUBJECTIVE AND OBJECTIVE BOX
SUBJECTIVE:    Patient is a 78y old Female who presents with a chief complaint of Hematuria (11 Jul 2023 22:24)    Currently admitted to medicine with the primary diagnosis of Bladder mass       Today is hospital day 2d. This morning she is resting comfortably in bed and reports no new issues or overnight events. Patient endorses lower back soreness, though thinks it could be from laying in bed all day.    PAST MEDICAL & SURGICAL HISTORY  Bladder cancer    Hypertension    Hyperlipidemia      SOCIAL HISTORY:  Negative for smoking/alcohol/drug use.     ALLERGIES:  No Known Allergies    MEDICATIONS:  STANDING MEDICATIONS  cefTRIAXone   IVPB 1000 milliGRAM(s) IV Intermittent every 24 hours  cefTRIAXone   IVPB      chlorhexidine 2% Cloths 1 Application(s) Topical <User Schedule>  lactated ringers. 1000 milliLiter(s) IV Continuous <Continuous>  polyethylene glycol 3350 17 Gram(s) Oral daily  senna 2 Tablet(s) Oral at bedtime    PRN MEDICATIONS  acetaminophen     Tablet .. 650 milliGRAM(s) Oral every 6 hours PRN    VITALS:   T(F): 97.2  HR: 57  BP: 112/54  RR: 18  SpO2: 99%    LABS:                        12.4   11.02 )-----------( 245      ( 11 Jul 2023 05:27 )             37.8     07-11    143  |  106  |  27<H>  ----------------------------<  94  3.7   |  25  |  1.1    Ca    9.2      11 Jul 2023 05:27  Mg     1.8     07-11    TPro  5.2<L>  /  Alb  3.5  /  TBili  0.6  /  DBili  x   /  AST  14  /  ALT  12  /  AlkPhos  55  07-11    PT/INR - ( 11 Jul 2023 05:27 )   PT: 11.00 sec;   INR: 0.97 ratio         PTT - ( 11 Jul 2023 05:27 )  PTT:27.2 sec  Urinalysis Basic - ( 11 Jul 2023 05:27 )    Color: x / Appearance: x / SG: x / pH: x  Gluc: 94 mg/dL / Ketone: x  / Bili: x / Urobili: x   Blood: x / Protein: x / Nitrite: x   Leuk Esterase: x / RBC: x / WBC x   Sq Epi: x / Non Sq Epi: x / Bacteria: x            Culture - Urine (collected 10 Jul 2023 17:11)  Source: Catheterized Catheterized  Preliminary Report (12 Jul 2023 07:54):    >100,000 CFU/ml Escherichia coli    Culture - Urine (collected 09 Jul 2023 20:22)  Source: Catheterized Catheterized  Final Report (11 Jul 2023 14:32):    <10,000 CFU/mL Normal Urogenital Razia          RADIOLOGY:      PHYSICAL EXAM:  PHYSICAL EXAM:  GENERAL: NAD, lying in bed comfortably  HEAD:  Atraumatic, Normocephalic  EYES: EOMI, sclera clear  ENT: Moist mucous membranes  NECK: Supple, trachea midline  CHEST/LUNG: Clear to auscultation anteriorly bilaterally; No rales, rhonchi, wheezing, or rubs. Unlabored respirations  HEART: Regular rate and rhythm; No appreciable murmurs, rubs, or gallops  ABDOMEN: (+)BS; Soft, slightly tender to light palpation, no guarding, nondistended  EXTREMITIES:  2+ Radial Pulses, brisk capillary refill. No clubbing, cyanosis, or edema  NERVOUS SYSTEM:  A&Ox3, no noted facial droop. Moving all extremities w/o difficulty.   SKIN: No rashes or lesions to b/l forearm, face.

## 2023-07-12 NOTE — PROGRESS NOTE ADULT - ASSESSMENT
77 y/o Female with extensive urologic history. Pt had hematuria in 2007 and was found to have High-grade urothelial carcinoma of the bladder non invasive with CIS  found to have R hydro to the level of a large pelvic mass.    Plan:   Case discussed with Dr. Mabry, plan as discussed:   - Continue IV abx per C&S - Rocephin   - UCx- >100,000 CFU/ml Escherichia coli  - F/u  Medical clearance for risk stratification   - Continue gutierrez care   - Cardiology clearance appreciated - Low-risk procedure for Moderate-risk for perioperative MACE  - Trend Cr   - Patient tentative plan for cysto, TURBT once medically optimized and UTI infection resolves   - Discussed plan with patient and patient' daughter (Moira Garcia) via phone regarding plan in detail, including risks/benefits. All questions asked and answered. Patient and patient's daughter aware and verbally agreeable.   - d/w attending

## 2023-07-13 LAB
-  AMIKACIN: SIGNIFICANT CHANGE UP
-  AMOXICILLIN/CLAVULANIC ACID: SIGNIFICANT CHANGE UP
-  AMPICILLIN/SULBACTAM: SIGNIFICANT CHANGE UP
-  AMPICILLIN: SIGNIFICANT CHANGE UP
-  AZTREONAM: SIGNIFICANT CHANGE UP
-  CEFAZOLIN: SIGNIFICANT CHANGE UP
-  CEFEPIME: SIGNIFICANT CHANGE UP
-  CEFOXITIN: SIGNIFICANT CHANGE UP
-  CEFTRIAXONE: SIGNIFICANT CHANGE UP
-  CEFUROXIME: SIGNIFICANT CHANGE UP
-  CIPROFLOXACIN: SIGNIFICANT CHANGE UP
-  ERTAPENEM: SIGNIFICANT CHANGE UP
-  GENTAMICIN: SIGNIFICANT CHANGE UP
-  IMIPENEM: SIGNIFICANT CHANGE UP
-  LEVOFLOXACIN: SIGNIFICANT CHANGE UP
-  MEROPENEM: SIGNIFICANT CHANGE UP
-  NITROFURANTOIN: SIGNIFICANT CHANGE UP
-  PIPERACILLIN/TAZOBACTAM: SIGNIFICANT CHANGE UP
-  TOBRAMYCIN: SIGNIFICANT CHANGE UP
-  TRIMETHOPRIM/SULFAMETHOXAZOLE: SIGNIFICANT CHANGE UP
ALBUMIN SERPL ELPH-MCNC: 3.7 G/DL — SIGNIFICANT CHANGE UP (ref 3.5–5.2)
ALP SERPL-CCNC: 63 U/L — SIGNIFICANT CHANGE UP (ref 30–115)
ALT FLD-CCNC: 12 U/L — SIGNIFICANT CHANGE UP (ref 0–41)
ANION GAP SERPL CALC-SCNC: 13 MMOL/L — SIGNIFICANT CHANGE UP (ref 7–14)
AST SERPL-CCNC: 14 U/L — SIGNIFICANT CHANGE UP (ref 0–41)
BASOPHILS # BLD AUTO: 0.05 K/UL — SIGNIFICANT CHANGE UP (ref 0–0.2)
BASOPHILS NFR BLD AUTO: 0.5 % — SIGNIFICANT CHANGE UP (ref 0–1)
BILIRUB SERPL-MCNC: <0.2 MG/DL — SIGNIFICANT CHANGE UP (ref 0.2–1.2)
BUN SERPL-MCNC: 25 MG/DL — HIGH (ref 10–20)
CALCIUM SERPL-MCNC: 8.9 MG/DL — SIGNIFICANT CHANGE UP (ref 8.4–10.4)
CHLORIDE SERPL-SCNC: 104 MMOL/L — SIGNIFICANT CHANGE UP (ref 98–110)
CO2 SERPL-SCNC: 21 MMOL/L — SIGNIFICANT CHANGE UP (ref 17–32)
CREAT SERPL-MCNC: 1.4 MG/DL — SIGNIFICANT CHANGE UP (ref 0.7–1.5)
EGFR: 38 ML/MIN/1.73M2 — LOW
EOSINOPHIL # BLD AUTO: 0.15 K/UL — SIGNIFICANT CHANGE UP (ref 0–0.7)
EOSINOPHIL NFR BLD AUTO: 1.4 % — SIGNIFICANT CHANGE UP (ref 0–8)
GLUCOSE SERPL-MCNC: 138 MG/DL — HIGH (ref 70–99)
HCT VFR BLD CALC: 38.1 % — SIGNIFICANT CHANGE UP (ref 37–47)
HGB BLD-MCNC: 12.7 G/DL — SIGNIFICANT CHANGE UP (ref 12–16)
IMM GRANULOCYTES NFR BLD AUTO: 0.7 % — HIGH (ref 0.1–0.3)
LYMPHOCYTES # BLD AUTO: 1.63 K/UL — SIGNIFICANT CHANGE UP (ref 1.2–3.4)
LYMPHOCYTES # BLD AUTO: 15.7 % — LOW (ref 20.5–51.1)
MAGNESIUM SERPL-MCNC: 2 MG/DL — SIGNIFICANT CHANGE UP (ref 1.8–2.4)
MCHC RBC-ENTMCNC: 28.5 PG — SIGNIFICANT CHANGE UP (ref 27–31)
MCHC RBC-ENTMCNC: 33.3 G/DL — SIGNIFICANT CHANGE UP (ref 32–37)
MCV RBC AUTO: 85.4 FL — SIGNIFICANT CHANGE UP (ref 81–99)
METHOD TYPE: SIGNIFICANT CHANGE UP
MONOCYTES # BLD AUTO: 0.84 K/UL — HIGH (ref 0.1–0.6)
MONOCYTES NFR BLD AUTO: 8.1 % — SIGNIFICANT CHANGE UP (ref 1.7–9.3)
NEUTROPHILS # BLD AUTO: 7.67 K/UL — HIGH (ref 1.4–6.5)
NEUTROPHILS NFR BLD AUTO: 73.6 % — SIGNIFICANT CHANGE UP (ref 42.2–75.2)
NRBC # BLD: 0 /100 WBCS — SIGNIFICANT CHANGE UP (ref 0–0)
PLATELET # BLD AUTO: 308 K/UL — SIGNIFICANT CHANGE UP (ref 130–400)
PMV BLD: 9.6 FL — SIGNIFICANT CHANGE UP (ref 7.4–10.4)
POTASSIUM SERPL-MCNC: 3.5 MMOL/L — SIGNIFICANT CHANGE UP (ref 3.5–5)
POTASSIUM SERPL-SCNC: 3.5 MMOL/L — SIGNIFICANT CHANGE UP (ref 3.5–5)
PROT SERPL-MCNC: 5.6 G/DL — LOW (ref 6–8)
RBC # BLD: 4.46 M/UL — SIGNIFICANT CHANGE UP (ref 4.2–5.4)
RBC # FLD: 14.3 % — SIGNIFICANT CHANGE UP (ref 11.5–14.5)
SODIUM SERPL-SCNC: 138 MMOL/L — SIGNIFICANT CHANGE UP (ref 135–146)
WBC # BLD: 10.41 K/UL — SIGNIFICANT CHANGE UP (ref 4.8–10.8)
WBC # FLD AUTO: 10.41 K/UL — SIGNIFICANT CHANGE UP (ref 4.8–10.8)

## 2023-07-13 PROCEDURE — 99233 SBSQ HOSP IP/OBS HIGH 50: CPT

## 2023-07-13 PROCEDURE — 76770 US EXAM ABDO BACK WALL COMP: CPT | Mod: 26

## 2023-07-13 PROCEDURE — 99231 SBSQ HOSP IP/OBS SF/LOW 25: CPT

## 2023-07-13 RX ADMIN — CEFTRIAXONE 100 MILLIGRAM(S): 500 INJECTION, POWDER, FOR SOLUTION INTRAMUSCULAR; INTRAVENOUS at 21:19

## 2023-07-13 RX ADMIN — LIDOCAINE 1 PATCH: 4 CREAM TOPICAL at 13:40

## 2023-07-13 RX ADMIN — SODIUM CHLORIDE 75 MILLILITER(S): 9 INJECTION, SOLUTION INTRAVENOUS at 03:02

## 2023-07-13 RX ADMIN — TAMSULOSIN HYDROCHLORIDE 0.4 MILLIGRAM(S): 0.4 CAPSULE ORAL at 21:27

## 2023-07-13 RX ADMIN — LIDOCAINE 1 PATCH: 4 CREAM TOPICAL at 19:02

## 2023-07-13 RX ADMIN — SENNA PLUS 2 TABLET(S): 8.6 TABLET ORAL at 21:27

## 2023-07-13 RX ADMIN — CHLORHEXIDINE GLUCONATE 1 APPLICATION(S): 213 SOLUTION TOPICAL at 03:03

## 2023-07-13 RX ADMIN — Medication 650 MILLIGRAM(S): at 20:29

## 2023-07-13 NOTE — PROGRESS NOTE ADULT - ASSESSMENT
77y/o F w/ pmh high grade urothelial ca a/w RIGHT hydronephrosis to the level of a large pelvic mass.     - Case was discussed with Dr. Maier who communicated directly with Dr. Mabry, plan as per attending   - CARDIO and MED clearances appreciated -- low risk for moderate risk procedure, further cardiac workup not indicated   - Continue IV Abx   - Obtain repeat UA/Urine culture tomorrow morning. Pending negative culture, patient is to be tentatively scheduled for cystoscopy, TURBT at [facility] on Monday 7/17 with Dr. Mabry.   - Plan discussed with daughter at bedside .

## 2023-07-13 NOTE — PROGRESS NOTE ADULT - TIME BILLING
time spent in direct care, multiple patient visits to communicate updates to patient and daughter, discussed with urology ACP and also separately with the urology attending.

## 2023-07-13 NOTE — PROGRESS NOTE ADULT - SUBJECTIVE AND OBJECTIVE BOX
UROLOGY CONSULT: Pt is a 77y/o F w/ pmh high grade urothelial ca a/w RIGHT hydronephrosis to the level of a large pelvic mass. Pt seen and examined at bedside. Communicated with Dr. Maier throughout the morning to coordinate care. No acute complaints at this time. Denies fever, chills, nausea, vomiting, flank pain at this time.     PAST MEDICAL & SURGICAL HISTORY:  Bladder cancer  Hypertension  Hyperlipidemia    MEDICATIONS  (STANDING):  cefTRIAXone   IVPB 1000 milliGRAM(s) IV Intermittent every 24 hours  cefTRIAXone   IVPB      chlorhexidine 2% Cloths 1 Application(s) Topical <User Schedule>  lactated ringers. 1000 milliLiter(s) (75 mL/Hr) IV Continuous <Continuous>  lidocaine   4% Patch 1 Patch Transdermal daily  polyethylene glycol 3350 17 Gram(s) Oral daily  senna 2 Tablet(s) Oral at bedtime  tamsulosin 0.4 milliGRAM(s) Oral at bedtime    MEDICATIONS  (PRN):  acetaminophen     Tablet .. 650 milliGRAM(s) Oral every 6 hours PRN Mild Pain (1 - 3), Moderate Pain (4 - 6)    Allergies  No Known Allergies    REVIEW OF SYSTEMS   [x] A ten-point review of systems was otherwise negative except as noted.    Vital Signs Last 24 Hrs  T(C): 36.3 (13 Jul 2023 12:57), Max: 36.6 (12 Jul 2023 21:23)  T(F): 97.3 (13 Jul 2023 12:57), Max: 97.8 (12 Jul 2023 21:23)  HR: 121 (13 Jul 2023 12:57) (63 - 121)  BP: 124/74 (13 Jul 2023 12:57) (124/74 - 132/72)  RR: 18 (13 Jul 2023 12:57) (18 - 20)  SpO2: 95% (13 Jul 2023 05:00) (95% - 95%)    PHYSICAL EXAM:  GEN: NAD, well-developed, awake and alert.  SKIN: Good color, non diaphoretic.  HEENT: NC/AT.  RESP: No dyspnea, non-labored breathing. No use of accessory muscles.  CARDIO: +S1/S2  ABDO: Soft, NT/ND, no palpable bladder, no suprapubic tenderness  BACK: No CVAT B/L  : + Indwelling gutierrez in place draining clear light pink with some sediment    I&O's Summary  12 Jul 2023 07:01  -  13 Jul 2023 07:00  --------------------------------------------------------  IN: 825 mL / OUT: 850 mL / NET: -25 mL    LABS:             12.6   9.42  )-----------( 259      ( 12 Jul 2023 08:36 )             38.0     141  |  106  |  22<H>  ----------------------------<  98  3.6   |  23  |  1.0    Ca    9.0      12 Jul 2023 08:36  Mg     1.7     07-12  PT/INR - ( 12 Jul 2023 08:36 )   PT: 11.20 sec;   INR: 0.98 ratio    PTT - ( 12 Jul 2023 08:36 )  PTT:27.3 sec    Urinalysis Basic - ( 12 Jul 2023 08:36 )  Color: x / Appearance: x / SG: x / pH: x  Gluc: 98 mg/dL / Ketone: x  / Bili: x / Urobili: x   Blood: x / Protein: x / Nitrite: x   Leuk Esterase: x / RBC: x / WBC x   Sq Epi: x / Non Sq Epi: x / Bacteria: x

## 2023-07-13 NOTE — PROGRESS NOTE ADULT - SUBJECTIVE AND OBJECTIVE BOX
SUBJECTIVE:    Patient is a 78y old Female who presents with a chief complaint of Hematuria (12 Jul 2023 08:20)    Currently admitted to medicine with the primary diagnosis of Bladder mass       Today is hospital day 3d. This morning she is resting comfortably in the chair by bedside and reports no new issues or overnight events.     PAST MEDICAL & SURGICAL HISTORY  Bladder cancer    Hypertension    Hyperlipidemia      SOCIAL HISTORY:  Negative for smoking/alcohol/drug use.     ALLERGIES:  No Known Allergies    MEDICATIONS:  STANDING MEDICATIONS  cefTRIAXone   IVPB      cefTRIAXone   IVPB 1000 milliGRAM(s) IV Intermittent every 24 hours  chlorhexidine 2% Cloths 1 Application(s) Topical <User Schedule>  lactated ringers. 1000 milliLiter(s) IV Continuous <Continuous>  lidocaine   4% Patch 1 Patch Transdermal daily  polyethylene glycol 3350 17 Gram(s) Oral daily  senna 2 Tablet(s) Oral at bedtime  tamsulosin 0.4 milliGRAM(s) Oral at bedtime    PRN MEDICATIONS  acetaminophen     Tablet .. 650 milliGRAM(s) Oral every 6 hours PRN    VITALS:   T(F): 96.2  HR: 63  BP: 132/72  RR: 18  SpO2: 95%    LABS:                        12.6   9.42  )-----------( 259      ( 12 Jul 2023 08:36 )             38.0     07-12    141  |  106  |  22<H>  ----------------------------<  98  3.6   |  23  |  1.0    Ca    9.0      12 Jul 2023 08:36  Mg     1.7     07-12      PT/INR - ( 12 Jul 2023 08:36 )   PT: 11.20 sec;   INR: 0.98 ratio         PTT - ( 12 Jul 2023 08:36 )  PTT:27.3 sec  Urinalysis Basic - ( 12 Jul 2023 08:36 )    Color: x / Appearance: x / SG: x / pH: x  Gluc: 98 mg/dL / Ketone: x  / Bili: x / Urobili: x   Blood: x / Protein: x / Nitrite: x   Leuk Esterase: x / RBC: x / WBC x   Sq Epi: x / Non Sq Epi: x / Bacteria: x            Culture - Urine (collected 10 Jul 2023 17:11)  Source: Catheterized Catheterized  Preliminary Report (12 Jul 2023 07:54):    >100,000 CFU/ml Escherichia coli          RADIOLOGY:    PHYSICAL EXAM:  GENERAL: NAD, sitting in chair next to bedside comfortably  HEAD:  Atraumatic, Normocephalic  EYES: EOMI, sclera clear  ENT: Moist mucous membranes  NECK: Supple, trachea midline  CHEST/LUNG: Clear to auscultation anteriorly bilaterally; No rales, rhonchi, wheezing, or rubs. Unlabored respirations  HEART: Regular rate and rhythm; No appreciable murmurs, rubs, or gallops  ABDOMEN: (+)BS; Soft, slightly tender to light palpation, no guarding, nondistended  EXTREMITIES:  2+ Radial Pulses, brisk capillary refill. No clubbing, cyanosis, or edema  NERVOUS SYSTEM:  A&Ox3, no noted facial droop. Moving all extremities w/o difficulty.        SUBJECTIVE:    Patient is a 78y old Female who presents with a chief complaint of Hematuria (12 Jul 2023 08:20)    Currently admitted to medicine with the primary diagnosis of Bladder mass       Today is hospital day 3d. This morning she is resting comfortably in the chair by bedside and reports no new issues or overnight events.     PAST MEDICAL & SURGICAL HISTORY  Bladder cancer    Hypertension    Hyperlipidemia      SOCIAL HISTORY:  Negative for smoking/alcohol/drug use.     ALLERGIES:  No Known Allergies    MEDICATIONS:  STANDING MEDICATIONS  cefTRIAXone   IVPB      cefTRIAXone   IVPB 1000 milliGRAM(s) IV Intermittent every 24 hours  chlorhexidine 2% Cloths 1 Application(s) Topical <User Schedule>  lactated ringers. 1000 milliLiter(s) IV Continuous <Continuous>  lidocaine   4% Patch 1 Patch Transdermal daily  polyethylene glycol 3350 17 Gram(s) Oral daily  senna 2 Tablet(s) Oral at bedtime  tamsulosin 0.4 milliGRAM(s) Oral at bedtime    PRN MEDICATIONS  acetaminophen     Tablet .. 650 milliGRAM(s) Oral every 6 hours PRN    VITALS:   T(F): 96.2  HR: 63  BP: 132/72  RR: 18  SpO2: 95%    LABS:                        12.6   9.42  )-----------( 259      ( 12 Jul 2023 08:36 )             38.0     07-12    141  |  106  |  22<H>  ----------------------------<  98  3.6   |  23  |  1.0    Ca    9.0      12 Jul 2023 08:36  Mg     1.7     07-12      PT/INR - ( 12 Jul 2023 08:36 )   PT: 11.20 sec;   INR: 0.98 ratio         PTT - ( 12 Jul 2023 08:36 )  PTT:27.3 sec  Urinalysis Basic - ( 12 Jul 2023 08:36 )    Color: x / Appearance: x / SG: x / pH: x  Gluc: 98 mg/dL / Ketone: x  / Bili: x / Urobili: x   Blood: x / Protein: x / Nitrite: x   Leuk Esterase: x / RBC: x / WBC x   Sq Epi: x / Non Sq Epi: x / Bacteria: x            Culture - Urine (collected 10 Jul 2023 17:11)  Source: Catheterized Catheterized  Preliminary Report (12 Jul 2023 07:54):    >100,000 CFU/ml Escherichia coli        PHYSICAL EXAM:  GENERAL: NAD, sitting in chair next to bedside comfortably  HEAD:  Atraumatic, Normocephalic  EYES: EOMI, sclera clear  ENT: Moist mucous membranes  NECK: Supple, trachea midline  CHEST/LUNG: Clear to auscultation anteriorly bilaterally; No rales, rhonchi, wheezing, or rubs. Unlabored respirations  HEART: Regular rate and rhythm; No appreciable murmurs, rubs, or gallops  ABDOMEN: (+)BS; Soft, slightly tender to light palpation, no guarding, nondistended  EXTREMITIES:  2+ Radial Pulses, brisk capillary refill. No clubbing, cyanosis, or edema  NERVOUS SYSTEM:  A&Ox3, no noted facial droop. Moving all extremities w/o difficulty.

## 2023-07-13 NOTE — PROGRESS NOTE ADULT - ATTENDING COMMENTS
Patient seen and examined independently with daughter at bedside.  NO events reported   Clear urine in [place] bag without evidence of blood clotting or significant hematuria      PAST MEDICAL & SURGICAL HISTORY:  Bladder neoplasm  Hypertension  Hyperlipidemia      TESTS & MEASUREMENTS:                        12.6   9.42  )-----------( 259      ( 12 Jul 2023 08:36 )             38.0     PT/INR - ( 12 Jul 2023 08:36 )   PT: 11.20 sec;   INR: 0.98 ratio         PTT - ( 12 Jul 2023 08:36 )  PTT:27.3 sec  07-12    141  |  106  |  22<H>  ----------------------------<  98  3.6   |  23  |  1.0    Ca    9.0      12 Jul 2023 08:36  Mg     1.7     07-12      In summary:  HEALTH ISSUES - PROBLEM Dx:    - Highly suspected recurrence of bladder cancer.   - Hematuria, resolving   - Right hydronephrosis   - CKD 3b  - Intrahepatic biliary dilation   - HTN on therapy as outpatient (HCTZ on hold)     PLAN  - Continue with Rocephin   - Repeat UCx on Friday (tomorrow)   - Tentative plan for Cysto+biopsy+possible stenting (hydronephrosis) at [facility] with Dr Mabry this Monday if prelim UCx is neg   - Medicine and Cardio preop eval completed , no further work-up needed as per cardiology eval in preparation for procedure   - Patient seen and examined independently with daughter at bedside.  NO events reported   Clear urine in [place] bag without evidence of blood clotting or significant hematuria      PAST MEDICAL & SURGICAL HISTORY:  Bladder neoplasm  Hypertension  Hyperlipidemia      TESTS & MEASUREMENTS:                        12.6   9.42  )-----------( 259      ( 12 Jul 2023 08:36 )             38.0     PT/INR - ( 12 Jul 2023 08:36 )   PT: 11.20 sec;   INR: 0.98 ratio         PTT - ( 12 Jul 2023 08:36 )  PTT:27.3 sec  07-12    141  |  106  |  22<H>  ----------------------------<  98  3.6   |  23  |  1.0    Ca    9.0      12 Jul 2023 08:36  Mg     1.7     07-12      In summary:  HEALTH ISSUES - PROBLEM Dx:    - Highly suspected recurrence of bladder cancer.   - Hematuria, resolving   - Right hydronephrosis   - CKD 3b  - Intrahepatic biliary dilation   - HTN on therapy as outpatient (HCTZ on hold)     PLAN  - Continue with Rocephin   - Repeat UCx on Friday (tomorrow)   - RUQ sono performed and awaiting results   - No further imaging/staging is required prior to tissue diagnosis (cystoscopy)   - Tentative plan for Cysto+biopsy+possible stenting (hydronephrosis) at [facility] with Dr Mabry this Monday if prelim UCx is neg   - Medicine and Cardio preop eval completed , no further work-up needed as per cardiology eval in preparation for procedure   -[place] remains clinically indicated as patient with hematuria and severe obstructive uropathy     Case discussed at length with multidisciplinary team on rounds.     Updated patient's daughter in person (Moira) and subsequently over the phone ([phone])

## 2023-07-13 NOTE — PROGRESS NOTE ADULT - ASSESSMENT
78-year-old female with history of bladder cancer (Diagnosed 10 years ago, s/p Intravesical therapy, No chemo or radiation, in remission), HTN and HLD presenting for Hematuria and decreased urine output. At baseline, patient is independent in ambulation. Imaging showing likely recurrence of bladder CA.    #Recurrence of bladder cancer w/ concern for mets  #Hematuria  #Severe Right hydronephrosis  - CT A&P shows large infiltrative soft tissue mass in the pelvis involving bladder and appears to invade the uterus. Severe right sided hydronephrosis; adrenal nodule, splenic hypodensities and pulmonary nodule  - Urology eval appreciated. d/w uro - f/u uro for cystoscopy, biopsy/TURBT (with Dr. Mabry) after medically optimized and UTI resolves. Recommend IR guided nephrostomy for right sided hydro  - per cardio and medicine team: pt would be low risk for moderate risk procedure. Further cardiac workup is not indicated  - IR f/u for nephrostomy - no IR intervention at this time  - Onc eval once biopsy results, possibly outpatient  - Active T&S, CBC BID for now  - Bladder irrigation as per urology  - c/w IV ceftriaxone 1g q24h  - gutierrez placed, c/w gutierrez care,  TOV after cystoscopy  - started pt on flomax    #Leucocytosis 2/2 Ca  - UA -ve for bacteria  - Urine Cx +ve E Coli  - c/w ceftriaxone 1g q24h    #Extra and intrahepatic dilation  - No symptoms, LFTs wnl  - GGT WNL     #CKD 3b- stable- Cr from 2022 stable  #HTN  - On Lisinopril and HCTZ at home  - Held due to IV contrast. Resume lisinopril if renal function stable per BP                                                                              ----------------------------------------------------  # DVT prophylaxis: SCD  # GI prophylaxis: Not indicated  # Diet: DASH/TLC  # Activity: AAT  # Code status: Full code  # Disposition:                                                                            --------------------------------------------------------    # Handoff:   - f/u urology for cystoscopy   78-year-old female with history of bladder cancer (Diagnosed 10 years ago, s/p Intravesical therapy, No chemo or radiation, in remission), HTN and HLD presenting for Hematuria and decreased urine output. At baseline, patient is independent in ambulation. Imaging showing likely recurrence of bladder CA.    #Recurrence of bladder cancer w/ concern for mets  #Hematuria  #Severe Right hydronephrosis  - CT A&P shows large infiltrative soft tissue mass in the pelvis involving bladder and appears to invade the uterus. Severe right sided hydronephrosis; adrenal nodule, splenic hypodensities and pulmonary nodule  - Urology eval appreciated. Recommend IR guided nephrostomy for right sided hydro  - per Dr. Mabry: cystoscopy and biopsy done at [facility] early next week if repeat UCx negative. f/u repeat Ucx  - per cardio and medicine team: pt would be low risk for moderate risk procedure. Further cardiac workup is not indicated  - IR f/u for nephrostomy - no IR intervention at this time. If unable to stent for cysto with urology and require decompression of kidney, consult IR for potential PCN placement  - Onc eval once biopsy results, possibly outpatient  - Active T&S, CBC BID for now  - Bladder irrigation as per urology  - c/w IV ceftriaxone 1g q24h  - gutierrez placed, c/w gutierrez care,  TOV after cystoscopy  - started pt on flomax    #Leucocytosis 2/2 Ca - resolved  - UA -ve for bacteria  - Urine Cx +ve E Coli  - c/w ceftriaxone 1g q24h    #Extra and intrahepatic dilation  - No symptoms, LFTs wnl  - GGT WNL     #CKD 3b- stable- Cr from 2022 stable  #HTN  - On Lisinopril and HCTZ at home  - Held due to IV contrast. Resume lisinopril if renal function stable per BP                                                                              ----------------------------------------------------  # DVT prophylaxis: SCD  # GI prophylaxis: Not indicated  # Diet: DASH/TLC  # Activity: AAT  # Code status: Full code  # Disposition:                                                                            --------------------------------------------------------    # Handoff:   - cystoscopy and biopsy done at [facility] with Dr. Mabry early next week if repeat Ucx is negative  - f/u repeat Ucx

## 2023-07-13 NOTE — CHART NOTE - NSCHARTNOTEFT_GEN_A_CORE
Patient scheduled for cysto with urology. If unable to stent at that time and require decompression of kidney, please consult IR for potential PCN placement.

## 2023-07-14 LAB
-  AMPICILLIN: SIGNIFICANT CHANGE UP
-  CIPROFLOXACIN: SIGNIFICANT CHANGE UP
-  LEVOFLOXACIN: SIGNIFICANT CHANGE UP
-  NITROFURANTOIN: SIGNIFICANT CHANGE UP
-  TETRACYCLINE: SIGNIFICANT CHANGE UP
-  VANCOMYCIN: SIGNIFICANT CHANGE UP
ALBUMIN SERPL ELPH-MCNC: 3.3 G/DL — LOW (ref 3.5–5.2)
ALP SERPL-CCNC: 53 U/L — SIGNIFICANT CHANGE UP (ref 30–115)
ALT FLD-CCNC: 11 U/L — SIGNIFICANT CHANGE UP (ref 0–41)
ANION GAP SERPL CALC-SCNC: 9 MMOL/L — SIGNIFICANT CHANGE UP (ref 7–14)
AST SERPL-CCNC: 14 U/L — SIGNIFICANT CHANGE UP (ref 0–41)
BASOPHILS # BLD AUTO: 0.04 K/UL — SIGNIFICANT CHANGE UP (ref 0–0.2)
BASOPHILS NFR BLD AUTO: 0.6 % — SIGNIFICANT CHANGE UP (ref 0–1)
BILIRUB SERPL-MCNC: 0.3 MG/DL — SIGNIFICANT CHANGE UP (ref 0.2–1.2)
BUN SERPL-MCNC: 23 MG/DL — HIGH (ref 10–20)
CALCIUM SERPL-MCNC: 9.1 MG/DL — SIGNIFICANT CHANGE UP (ref 8.4–10.5)
CHLORIDE SERPL-SCNC: 110 MMOL/L — SIGNIFICANT CHANGE UP (ref 98–110)
CO2 SERPL-SCNC: 25 MMOL/L — SIGNIFICANT CHANGE UP (ref 17–32)
CREAT SERPL-MCNC: 1.1 MG/DL — SIGNIFICANT CHANGE UP (ref 0.7–1.5)
CULTURE RESULTS: SIGNIFICANT CHANGE UP
EGFR: 51 ML/MIN/1.73M2 — LOW
EOSINOPHIL # BLD AUTO: 0.16 K/UL — SIGNIFICANT CHANGE UP (ref 0–0.7)
EOSINOPHIL NFR BLD AUTO: 2.4 % — SIGNIFICANT CHANGE UP (ref 0–8)
GLUCOSE SERPL-MCNC: 106 MG/DL — HIGH (ref 70–99)
HCT VFR BLD CALC: 35.9 % — LOW (ref 37–47)
HGB BLD-MCNC: 11.8 G/DL — LOW (ref 12–16)
IMM GRANULOCYTES NFR BLD AUTO: 0.6 % — HIGH (ref 0.1–0.3)
LYMPHOCYTES # BLD AUTO: 1.26 K/UL — SIGNIFICANT CHANGE UP (ref 1.2–3.4)
LYMPHOCYTES # BLD AUTO: 18.7 % — LOW (ref 20.5–51.1)
MAGNESIUM SERPL-MCNC: 1.9 MG/DL — SIGNIFICANT CHANGE UP (ref 1.8–2.4)
MCHC RBC-ENTMCNC: 28.7 PG — SIGNIFICANT CHANGE UP (ref 27–31)
MCHC RBC-ENTMCNC: 32.9 G/DL — SIGNIFICANT CHANGE UP (ref 32–37)
MCV RBC AUTO: 87.3 FL — SIGNIFICANT CHANGE UP (ref 81–99)
METHOD TYPE: SIGNIFICANT CHANGE UP
MONOCYTES # BLD AUTO: 0.48 K/UL — SIGNIFICANT CHANGE UP (ref 0.1–0.6)
MONOCYTES NFR BLD AUTO: 7.1 % — SIGNIFICANT CHANGE UP (ref 1.7–9.3)
NEUTROPHILS # BLD AUTO: 4.77 K/UL — SIGNIFICANT CHANGE UP (ref 1.4–6.5)
NEUTROPHILS NFR BLD AUTO: 70.6 % — SIGNIFICANT CHANGE UP (ref 42.2–75.2)
NRBC # BLD: 0 /100 WBCS — SIGNIFICANT CHANGE UP (ref 0–0)
ORGANISM # SPEC MICROSCOPIC CNT: SIGNIFICANT CHANGE UP
PLATELET # BLD AUTO: 259 K/UL — SIGNIFICANT CHANGE UP (ref 130–400)
PMV BLD: 9.6 FL — SIGNIFICANT CHANGE UP (ref 7.4–10.4)
POTASSIUM SERPL-MCNC: 3.6 MMOL/L — SIGNIFICANT CHANGE UP (ref 3.5–5)
POTASSIUM SERPL-SCNC: 3.6 MMOL/L — SIGNIFICANT CHANGE UP (ref 3.5–5)
PROT SERPL-MCNC: 4.9 G/DL — LOW (ref 6–8)
RBC # BLD: 4.11 M/UL — LOW (ref 4.2–5.4)
RBC # FLD: 14.3 % — SIGNIFICANT CHANGE UP (ref 11.5–14.5)
SODIUM SERPL-SCNC: 144 MMOL/L — SIGNIFICANT CHANGE UP (ref 135–146)
SPECIMEN SOURCE: SIGNIFICANT CHANGE UP
WBC # BLD: 6.75 K/UL — SIGNIFICANT CHANGE UP (ref 4.8–10.8)
WBC # FLD AUTO: 6.75 K/UL — SIGNIFICANT CHANGE UP (ref 4.8–10.8)

## 2023-07-14 PROCEDURE — 99232 SBSQ HOSP IP/OBS MODERATE 35: CPT

## 2023-07-14 RX ORDER — AMLODIPINE BESYLATE 2.5 MG/1
5 TABLET ORAL DAILY
Refills: 0 | Status: DISCONTINUED | OUTPATIENT
Start: 2023-07-14 | End: 2023-07-17

## 2023-07-14 RX ADMIN — LIDOCAINE 1 PATCH: 4 CREAM TOPICAL at 12:45

## 2023-07-14 RX ADMIN — CHLORHEXIDINE GLUCONATE 1 APPLICATION(S): 213 SOLUTION TOPICAL at 07:01

## 2023-07-14 RX ADMIN — SODIUM CHLORIDE 75 MILLILITER(S): 9 INJECTION, SOLUTION INTRAVENOUS at 04:35

## 2023-07-14 RX ADMIN — Medication 650 MILLIGRAM(S): at 09:30

## 2023-07-14 RX ADMIN — TAMSULOSIN HYDROCHLORIDE 0.4 MILLIGRAM(S): 0.4 CAPSULE ORAL at 21:32

## 2023-07-14 RX ADMIN — Medication 650 MILLIGRAM(S): at 20:13

## 2023-07-14 RX ADMIN — Medication 650 MILLIGRAM(S): at 08:29

## 2023-07-14 NOTE — PROGRESS NOTE ADULT - SUBJECTIVE AND OBJECTIVE BOX
SUBJECTIVE:    Patient is a 78y old Female who presents with a chief complaint of Hematuria (12 Jul 2023 08:20)    Currently admitted to medicine with the primary diagnosis of Bladder mass       Today is hospital day 4d. This morning she is resting comfortably in bed and reports no new issues or overnight events.     PAST MEDICAL & SURGICAL HISTORY  Bladder cancer    Hypertension    Hyperlipidemia      SOCIAL HISTORY:  Negative for smoking/alcohol/drug use.     ALLERGIES:  No Known Allergies    MEDICATIONS:  STANDING MEDICATIONS  chlorhexidine 2% Cloths 1 Application(s) Topical <User Schedule>  lactated ringers. 1000 milliLiter(s) IV Continuous <Continuous>  lidocaine   4% Patch 1 Patch Transdermal daily  polyethylene glycol 3350 17 Gram(s) Oral daily  senna 2 Tablet(s) Oral at bedtime  tamsulosin 0.4 milliGRAM(s) Oral at bedtime    PRN MEDICATIONS  acetaminophen     Tablet .. 650 milliGRAM(s) Oral every 6 hours PRN    VITALS:   T(F): 97  HR: 60  BP: 153/70  RR: 18  SpO2: --    LABS:                        12.7   10.41 )-----------( 308      ( 13 Jul 2023 17:10 )             38.1     07-13    138  |  104  |  25<H>  ----------------------------<  138<H>  3.5   |  21  |  1.4    Ca    8.9      13 Jul 2023 17:10  Mg     2.0     07-13    TPro  5.6<L>  /  Alb  3.7  /  TBili  <0.2  /  DBili  x   /  AST  14  /  ALT  12  /  AlkPhos  63  07-13    PT/INR - ( 12 Jul 2023 08:36 )   PT: 11.20 sec;   INR: 0.98 ratio         PTT - ( 12 Jul 2023 08:36 )  PTT:27.3 sec  Urinalysis Basic - ( 13 Jul 2023 17:10 )    Color: x / Appearance: x / SG: x / pH: x  Gluc: 138 mg/dL / Ketone: x  / Bili: x / Urobili: x   Blood: x / Protein: x / Nitrite: x   Leuk Esterase: x / RBC: x / WBC x   Sq Epi: x / Non Sq Epi: x / Bacteria: x                RADIOLOGY:    PHYSICAL EXAM:  GENERAL: NAD, lying comfortably in bed  HEAD:  Atraumatic, Normocephalic  EYES: EOMI, sclera clear  ENT: Moist mucous membranes  NECK: Supple, trachea midline  CHEST/LUNG: Clear to auscultation anteriorly bilaterally; No rales, rhonchi, wheezing, or rubs. Unlabored respirations  HEART: Regular rate and rhythm; No appreciable murmurs, rubs, or gallops  ABDOMEN: (+)BS; Soft, nontender, no guarding, nondistended  EXTREMITIES:  2+ Radial Pulses, brisk capillary refill. No clubbing, cyanosis, or edema  NERVOUS SYSTEM:  A&Ox3, no noted facial droop. Moving all extremities w/o difficulty.

## 2023-07-14 NOTE — PROGRESS NOTE ADULT - ASSESSMENT
78-year-old female with history of bladder cancer (Diagnosed 10 years ago, s/p Intravesical therapy, No chemo or radiation, in remission), HTN and HLD presenting for Hematuria and decreased urine output. At baseline, patient is independent in ambulation. Imaging showing likely recurrence of bladder CA.    #Recurrence of bladder cancer w/ concern for mets  #Hematuria  #Severe Right hydronephrosis  - CT A&P shows large infiltrative soft tissue mass in the pelvis involving bladder and appears to invade the uterus. Severe right sided hydronephrosis; adrenal nodule, splenic hypodensities and pulmonary nodule  - Urology eval appreciated. Recommend IR guided nephrostomy for right sided hydro  - per Dr. Mabry: cystoscopy and biopsy done at [facility] early next week if repeat UCx negative. f/u repeat Ucx  - per cardio and medicine team: pt would be low risk for moderate risk procedure. Further cardiac workup is not indicated  - IR f/u for nephrostomy - no IR intervention at this time. If unable to stent for cysto with urology and require decompression of kidney, consult IR for potential PCN placement  - Onc eval once biopsy results, possibly outpatient  - Active T&S, CBC BID for now  - Bladder irrigation as per urology  - c/w IV ceftriaxone 1g q24h  - gutierrez placed, c/w gutierrez care,  TOV after cystoscopy  - started pt on flomax    #Leucocytosis 2/2 Ca - resolved  - UA -ve for bacteria  - Urine Cx +ve E Coli  - c/w ceftriaxone 1g q24h    #Extra and intrahepatic dilation  - No symptoms, LFTs wnl  - GGT WNL     #CKD 3b- stable- Cr from 2022 stable  #HTN  - On Lisinopril and HCTZ at home  - Held due to IV contrast. Resume lisinopril if renal function stable per BP                                                                              ----------------------------------------------------  # DVT prophylaxis: SCD  # GI prophylaxis: Not indicated  # Diet: DASH/TLC  # Activity: AAT  # Code status: Full code  # Disposition: Home                                                                           --------------------------------------------------------    # Handoff:   - cystoscopy and biopsy done at [facility] with Dr. Mabry early next week if repeat Ucx is negative  - f/u repeat Ucx 78-year-old female with history of bladder cancer (Diagnosed 10 years ago, s/p Intravesical therapy, No chemo or radiation, in remission), HTN and HLD presenting for Hematuria and decreased urine output. At baseline, patient is independent in ambulation. Imaging showing likely recurrence of bladder CA.    #Recurrence of bladder cancer w/ concern for mets  #Hematuria  #Severe Right hydronephrosis  - CT A&P shows large infiltrative soft tissue mass in the pelvis involving bladder and appears to invade the uterus. Severe right sided hydronephrosis; adrenal nodule, splenic hypodensities and pulmonary nodule  - Urology eval appreciated. Recommend IR guided nephrostomy for right sided hydro  - per Dr. Mabry: cystoscopy and biopsy done at [facility] early next week if repeat UCx negative. f/u repeat Ucx  - per cardio and medicine team: pt would be low risk for moderate risk procedure. Further cardiac workup is not indicated  - IR f/u for nephrostomy - no IR intervention at this time. If unable to stent for cysto with urology and require decompression of kidney, consult IR for potential PCN placement  - Onc eval once biopsy results, possibly outpatient  - Active T&S, CBC BID for now  - Bladder irrigation as per urology  - c/w IV ceftriaxone 1g q24h  - gutierrez placed, c/w gutierrez care  - c/w flomax    #Leucocytosis 2/2 Ca - resolved  - UA -ve for bacteria  - Urine Cx +ve E Coli  - ceftriaxone 1g q24h - completed 7/13    #Extra and intrahepatic dilation  - No symptoms, LFTs wnl  - GGT WNL     #CKD 3b- stable- Cr from 2022 stable  #HTN  - On Lisinopril and HCTZ at home  - Held due to IV contrast. Resume lisinopril if renal function stable per BP                                                                              ----------------------------------------------------  # DVT prophylaxis: SCD  # GI prophylaxis: Not indicated  # Diet: DASH/TLC  # Activity: AAT  # Code status: Full code  # Disposition: Home                                                                           --------------------------------------------------------    # Handoff:   - cystoscopy and biopsy done at [facility] with Dr. Mabry early next week if repeat Ucx is negative  - f/u repeat Ucx

## 2023-07-14 NOTE — PROGRESS NOTE ADULT - ASSESSMENT
79y/o F w/ pmh high grade urothelial ca a/w RIGHT hydronephrosis to the level of a large pelvic mass.     - Pt seen and examined at bedside with Dr. Horne, plan as per attending   - CARDIO and MED clearances appreciated -- low risk for moderate risk procedure, further cardiac workup not indicated   - Continue IV Abx   - Repeat Culture was sent this AM, Pending negative culture, patient is tentatively scheduled for cystoscopy, TURBT at [facility] on Monday 7/17 with Dr. Mabry. Please prepare for transfer SUNDAY . 79y/o F w/ pmh high grade urothelial ca a/w RIGHT hydronephrosis to the level of a large pelvic mass.     - Pt seen and examined at bedside with Dr. Horne, plan as per attending   - CARDIO and MED clearances appreciated -- low risk for moderate risk procedure, further cardiac workup not indicated   - Continue IV Abx   - Repeat Culture was sent this AM, Pending negative culture, patient is tentatively scheduled for cystoscopy, TURBT at [facility] on Monday 7/17 with Dr. Mabry. Please prepare for transfer SUNDAY .    pt seen on afternoon rounds she is uncomfortable from the gutierrez but tolerating it  repeat cultures pending  assuming NG she marilyn go to [facility] over the weekend for turbt monday

## 2023-07-14 NOTE — PROGRESS NOTE ADULT - SUBJECTIVE AND OBJECTIVE BOX
UROLOGY: Pt is a 77y/o F w/ pmh high grade urothelial ca a/w RIGHT hydronephrosis to the level of a large pelvic mass. Pt seen and examined at bedside with Dr. Horne. No acute complaints at this time. Denies fever, nausea, vomiting, chills, flank pain at this time.     REVIEW OF SYSTEMS   [x] A ten-point review of systems was otherwise negative except as noted.    Vital Signs Last 24 Hrs  T(C): 35.5 (14 Jul 2023 13:42), Max: 36.6 (13 Jul 2023 20:01)  T(F): 95.9 (14 Jul 2023 13:42), Max: 97.9 (13 Jul 2023 20:01)  HR: 109 (14 Jul 2023 13:42) (60 - 109)  BP: 147/66 (14 Jul 2023 13:42) (145/69 - 153/70)  RR: 18 (14 Jul 2023 13:42) (18 - 18)    PHYSICAL EXAM:  GEN: NAD, well-developed, awake and alert.  SKIN: Good color, non diaphoretic.  HEENT: NC/AT.  RESP: No dyspnea, non-labored breathing. No use of accessory muscles.  CARDIO: +S1/S2  ABDO: Soft, NT/ND, no palpable bladder, no suprapubic tenderness  BACK: No CVAT B/L  : + Indwelling gutierrez in place draining clear blood tinged with no clots.   LABS:             11.8   6.75  )-----------( 259      ( 14 Jul 2023 07:24 )             35.9     144  |  110  |  23<H>  ----------------------------<  106<H>  3.6   |  25  |  1.1

## 2023-07-14 NOTE — PROGRESS NOTE ADULT - ATTENDING COMMENTS
78-year-old female with history of bladder cancer (Diagnosed 10 years ago, s/p Intravesical therapy, No chemo or radiation, in remission), HTN and HLD presenting for Hematuria and decreased urine output. At baseline, patient is independent in ambulation. Imaging showing likely recurrence of bladder CA.    #Recurrence of bladder cancer w/ concern for mets  #Hematuria  #Severe Right hydronephrosis  - CT A&P shows large infiltrative soft tissue mass in the pelvis involving bladder and appears to invade the uterus. Severe right sided hydronephrosis; adrenal nodule, splenic hypodensities and pulmonary nodule  - low risk for moderate risk procedure. Further cardiac workup is not indicated  - s/p IV ceftriaxone 1g q24h  - continue gutierrez - frequent Bladder irrigation as per urology  - c/w flomax  - f/u repeat Ucx  - Urology - - per Dr. Mabry: cystoscopy and biopsy done at [facility] Monday 7/17 if repeat UCx negative.  - IR- IR guided PCN for right sided hydro if failure of cystoscopy   - keep Active T&S, monitor CBC    #CKD 3b- stable- Cr from 2022 stable  #HTN  - On Lisinopril and HCTZ at home  - lisinopril and HCTZ on hold  - start amlodipine                                                                               # DVT prophylaxis: SCD  # GI prophylaxis: Not indicated  # Diet: DASH/TLC  # Activity: AAT  # Code status: Full code  # Disposition: Home    # Handoff:   - cystoscopy and biopsy done at [facility] with Dr. Mabry monday if repeat Ucx is negative  - f/u repeat Ucx

## 2023-07-15 LAB
ALBUMIN SERPL ELPH-MCNC: 3.5 G/DL — SIGNIFICANT CHANGE UP (ref 3.5–5.2)
ALP SERPL-CCNC: 56 U/L — SIGNIFICANT CHANGE UP (ref 30–115)
ALT FLD-CCNC: 11 U/L — SIGNIFICANT CHANGE UP (ref 0–41)
ANION GAP SERPL CALC-SCNC: 11 MMOL/L — SIGNIFICANT CHANGE UP (ref 7–14)
AST SERPL-CCNC: 15 U/L — SIGNIFICANT CHANGE UP (ref 0–41)
BASOPHILS # BLD AUTO: 0.05 K/UL — SIGNIFICANT CHANGE UP (ref 0–0.2)
BASOPHILS NFR BLD AUTO: 0.7 % — SIGNIFICANT CHANGE UP (ref 0–1)
BILIRUB SERPL-MCNC: 0.4 MG/DL — SIGNIFICANT CHANGE UP (ref 0.2–1.2)
BUN SERPL-MCNC: 18 MG/DL — SIGNIFICANT CHANGE UP (ref 10–20)
CALCIUM SERPL-MCNC: 9.4 MG/DL — SIGNIFICANT CHANGE UP (ref 8.4–10.5)
CHLORIDE SERPL-SCNC: 109 MMOL/L — SIGNIFICANT CHANGE UP (ref 98–110)
CO2 SERPL-SCNC: 23 MMOL/L — SIGNIFICANT CHANGE UP (ref 17–32)
CREAT SERPL-MCNC: 1.1 MG/DL — SIGNIFICANT CHANGE UP (ref 0.7–1.5)
CULTURE RESULTS: NO GROWTH — SIGNIFICANT CHANGE UP
EGFR: 51 ML/MIN/1.73M2 — LOW
EOSINOPHIL # BLD AUTO: 0.23 K/UL — SIGNIFICANT CHANGE UP (ref 0–0.7)
EOSINOPHIL NFR BLD AUTO: 3.1 % — SIGNIFICANT CHANGE UP (ref 0–8)
GLUCOSE SERPL-MCNC: 108 MG/DL — HIGH (ref 70–99)
HCT VFR BLD CALC: 37.8 % — SIGNIFICANT CHANGE UP (ref 37–47)
HGB BLD-MCNC: 12.4 G/DL — SIGNIFICANT CHANGE UP (ref 12–16)
IMM GRANULOCYTES NFR BLD AUTO: 0.8 % — HIGH (ref 0.1–0.3)
LYMPHOCYTES # BLD AUTO: 1.41 K/UL — SIGNIFICANT CHANGE UP (ref 1.2–3.4)
LYMPHOCYTES # BLD AUTO: 19 % — LOW (ref 20.5–51.1)
MAGNESIUM SERPL-MCNC: 1.8 MG/DL — SIGNIFICANT CHANGE UP (ref 1.8–2.4)
MCHC RBC-ENTMCNC: 28.6 PG — SIGNIFICANT CHANGE UP (ref 27–31)
MCHC RBC-ENTMCNC: 32.8 G/DL — SIGNIFICANT CHANGE UP (ref 32–37)
MCV RBC AUTO: 87.1 FL — SIGNIFICANT CHANGE UP (ref 81–99)
MONOCYTES # BLD AUTO: 0.58 K/UL — SIGNIFICANT CHANGE UP (ref 0.1–0.6)
MONOCYTES NFR BLD AUTO: 7.8 % — SIGNIFICANT CHANGE UP (ref 1.7–9.3)
NEUTROPHILS # BLD AUTO: 5.09 K/UL — SIGNIFICANT CHANGE UP (ref 1.4–6.5)
NEUTROPHILS NFR BLD AUTO: 68.6 % — SIGNIFICANT CHANGE UP (ref 42.2–75.2)
NRBC # BLD: 0 /100 WBCS — SIGNIFICANT CHANGE UP (ref 0–0)
PLATELET # BLD AUTO: 280 K/UL — SIGNIFICANT CHANGE UP (ref 130–400)
PMV BLD: 9.1 FL — SIGNIFICANT CHANGE UP (ref 7.4–10.4)
POTASSIUM SERPL-MCNC: 4.4 MMOL/L — SIGNIFICANT CHANGE UP (ref 3.5–5)
POTASSIUM SERPL-SCNC: 4.4 MMOL/L — SIGNIFICANT CHANGE UP (ref 3.5–5)
PROT SERPL-MCNC: 5.3 G/DL — LOW (ref 6–8)
RBC # BLD: 4.34 M/UL — SIGNIFICANT CHANGE UP (ref 4.2–5.4)
RBC # FLD: 14.4 % — SIGNIFICANT CHANGE UP (ref 11.5–14.5)
SODIUM SERPL-SCNC: 143 MMOL/L — SIGNIFICANT CHANGE UP (ref 135–146)
SPECIMEN SOURCE: SIGNIFICANT CHANGE UP
WBC # BLD: 7.42 K/UL — SIGNIFICANT CHANGE UP (ref 4.8–10.8)
WBC # FLD AUTO: 7.42 K/UL — SIGNIFICANT CHANGE UP (ref 4.8–10.8)

## 2023-07-15 PROCEDURE — 99232 SBSQ HOSP IP/OBS MODERATE 35: CPT

## 2023-07-15 PROCEDURE — 99231 SBSQ HOSP IP/OBS SF/LOW 25: CPT

## 2023-07-15 RX ADMIN — LIDOCAINE 1 PATCH: 4 CREAM TOPICAL at 12:15

## 2023-07-15 RX ADMIN — Medication 650 MILLIGRAM(S): at 15:25

## 2023-07-15 RX ADMIN — Medication 650 MILLIGRAM(S): at 06:21

## 2023-07-15 RX ADMIN — AMLODIPINE BESYLATE 5 MILLIGRAM(S): 2.5 TABLET ORAL at 06:21

## 2023-07-15 RX ADMIN — CHLORHEXIDINE GLUCONATE 1 APPLICATION(S): 213 SOLUTION TOPICAL at 07:45

## 2023-07-15 RX ADMIN — Medication 650 MILLIGRAM(S): at 14:55

## 2023-07-15 RX ADMIN — LIDOCAINE 1 PATCH: 4 CREAM TOPICAL at 19:22

## 2023-07-15 RX ADMIN — TAMSULOSIN HYDROCHLORIDE 0.4 MILLIGRAM(S): 0.4 CAPSULE ORAL at 21:12

## 2023-07-15 RX ADMIN — SODIUM CHLORIDE 75 MILLILITER(S): 9 INJECTION, SOLUTION INTRAVENOUS at 14:55

## 2023-07-15 NOTE — PROGRESS NOTE ADULT - ASSESSMENT
78-year-old female with history of bladder cancer (Diagnosed 10 years ago, s/p Intravesical therapy, No chemo or radiation, in remission), HTN and HLD presenting for Hematuria and decreased urine output. At baseline, patient is independent in ambulation. Imaging showing likely recurrence of bladder CA.    #Recurrence of bladder cancer w/ concern for mets  #Hematuria  #Severe Right hydronephrosis  - CT A&P shows large infiltrative soft tissue mass in the pelvis involving bladder and appears to invade the uterus. Severe right sided hydronephrosis; adrenal nodule, splenic hypodensities and pulmonary nodule  - Urology eval appreciated. Recommend IR guided nephrostomy for right sided hydro  - per Dr. Mabry: cystoscopy and biopsy done at [facility] early next week if repeat UCx negative. f/u repeat Ucx  - per cardio and medicine team: pt would be low risk for moderate risk procedure. Further cardiac workup is not indicated  - IR f/u for nephrostomy - no IR intervention at this time. If unable to stent for cysto with urology and require decompression of kidney, consult IR for potential PCN placement  - Onc eval once biopsy results, possibly outpatient  - Active T&S, CBC BID for now  - Bladder irrigation as per urology  - c/w IV ceftriaxone 1g q24h  - gutierrez placed, c/w gutierrez care  - c/w flomax    #Leucocytosis 2/2 Ca - resolved  - UA -ve for bacteria  - Urine Cx +ve E Coli  - ceftriaxone 1g q24h - completed 7/13    #Extra and intrahepatic dilation  - No symptoms, LFTs wnl  - GGT WNL     #CKD 3b- stable- Cr from 2022 stable  #HTN  - On Lisinopril and HCTZ at home  - Held due to IV contrast. Resume lisinopril if renal function stable per BP                                                                              ----------------------------------------------------  # DVT prophylaxis: SCD  # GI prophylaxis: Not indicated  # Diet: DASH/TLC  # Activity: AAT  # Code status: Full code  # Disposition: Home                                                                           --------------------------------------------------------    # Handoff:   - cystoscopy and biopsy done at [facility] with Dr. Mabry early next week if repeat Ucx is negative  - f/u repeat Ucx 78-year-old female with history of bladder cancer (Diagnosed 10 years ago, s/p Intravesical therapy, No chemo or radiation, in remission), HTN and HLD presenting for Hematuria and decreased urine output. At baseline, patient is independent in ambulation. Imaging showing likely recurrence of bladder CA.    #Recurrence of bladder cancer w/ concern for mets  #Hematuria  #Severe Right hydronephrosis  - CT A&P shows large infiltrative soft tissue mass in the pelvis involving bladder and appears to invade the uterus. Severe right sided hydronephrosis; adrenal nodule, splenic hypodensities and pulmonary nodule  - Urology eval appreciated. Recommend IR guided nephrostomy for right sided hydro  - per Dr. Mabry: cystoscopy and biopsy done at [facility] early next week if repeat UCx negative. f/u repeat Ucx  - per cardio and medicine team: pt would be low risk for moderate risk procedure. Further cardiac workup is not indicated  - IR f/u for nephrostomy - no IR intervention at this time. If unable to stent for cysto with urology and require decompression of kidney, consult IR for potential PCN placement  - Onc eval once biopsy results, possibly outpatient  - Active T&S  - Bladder irrigation as per urology, flush every shift  - c/w IV ceftriaxone 1g q24h  - gtuierrez placed, c/w gutierrez care  - c/w flomax    #Leucocytosis 2/2 Ca - resolved  - UA -ve for bacteria  - Urine Cx +ve E Coli, f/u repeat culture  - ceftriaxone 1g q24h - completed 7/13    #Extra and intrahepatic dilation  - No symptoms, LFTs wnl  - GGT WNL     #CKD 3b- stable- Cr from 2022 stable  #HTN  - On Lisinopril and HCTZ at home  - Held due to IV contrast. Resume lisinopril if renal function stable per BP                                                                              ----------------------------------------------------  # DVT prophylaxis: SCD  # GI prophylaxis: Not indicated  # Diet: DASH/TLC  # Activity: AAT  # Code status: Full code  # Disposition: Home                                                                           --------------------------------------------------------    # Handoff:   - cystoscopy and biopsy done at [facility] with Dr. Mabry early next week if repeat Ucx is negative  - f/u repeat Ucx 78-year-old female with history of bladder cancer (Diagnosed 10 years ago, s/p Intravesical therapy, No chemo or radiation, in remission), HTN and HLD presenting for Hematuria and decreased urine output. At baseline, patient is independent in ambulation. Imaging showing likely recurrence of bladder CA.    #Recurrence of bladder cancer w/ concern for mets  #Hematuria  #Severe Right hydronephrosis  - CT A&P shows large infiltrative soft tissue mass in the pelvis involving bladder and appears to invade the uterus. Severe right sided hydronephrosis; adrenal nodule, splenic hypodensities and pulmonary nodule  - Urology eval appreciated. Recommend IR guided nephrostomy for right sided hydro  - per Dr. Mabry: cystoscopy and biopsy done at [facility] early next week if repeat UCx negative. f/u repeat Ucx  - prepare for transfer Sunday 7/16  - per cardio and medicine team: pt would be low risk for moderate risk procedure. Further cardiac workup is not indicated  - IR f/u for nephrostomy - no IR intervention at this time. If unable to stent for cysto with urology and require decompression of kidney, consult IR for potential PCN placement  - Onc eval once biopsy results, possibly outpatient  - Active T&S  - Bladder irrigation as per urology, flush every shift  - c/w IV ceftriaxone 1g q24h  - gutierrez placed, c/w gutierrez care  - c/w flomax    #Leucocytosis 2/2 Ca - resolved  - UA -ve for bacteria  - Urine Cx +ve E Coli, f/u repeat culture  - ceftriaxone 1g q24h - completed 7/13    #Extra and intrahepatic dilation  - No symptoms, LFTs wnl  - GGT WNL     #CKD 3b- stable- Cr from 2022 stable  #HTN  - On Lisinopril and HCTZ at home  - Held due to IV contrast. Resume lisinopril if renal function stable per BP                                                                              ----------------------------------------------------  # DVT prophylaxis: SCD  # GI prophylaxis: Not indicated  # Diet: DASH/TLC  # Activity: AAT  # Code status: Full code  # Disposition: Home                                                                           --------------------------------------------------------    # Handoff:   - cystoscopy and biopsy done at [facility] with Dr. Mabry early next week if repeat Ucx is negative  - f/u repeat Ucx

## 2023-07-15 NOTE — CHART NOTE - NSCHARTNOTEFT_GEN_A_CORE
78-year-old female with history of bladder cancer (Diagnosed 10 years ago, s/p Intravesical therapy, No chemo or radiation, in remission), HTN and HLD presenting for Hematuria and decreased urine output. At baseline, patient is independent in ambulation.    Patient was in her usual state of health 1 year ago when she started having occasional on and off hematuria, it was not bothering her. 2 days ago patient had decreased urine output for which she presented to ED, gutierrez catheter was placed which drained bloody urine, patient was discharged but 1 day later the gutierrez was clogged and patient presented to ED. The gutierrez was flushed and bloody urine was drained with concern for clots. Denies HA, dizziness, NVD, fever, SOB, chest pain, abdominal pain, change in urination and change in bowel habits. Of note, patient endorses intentional 45 lbs weight loss since 2 years and lower back pain since 3 months.    In the ED, CT A&P shows 6.3 cm bladder mass invading the uterus and right ureter with hydronephrosis, 1.6cm adrenal nodule, 4mm right middle lobe lung nodule, splenic hypodensity and intra and extra hepatic biliary dilation. WBC 13K, no signs of infection.    Patient was admitted to medicine for further evaluation. IR was consulted for the right ureter hydronephrosis found on imaging, but no IR intervention at this time. Urology was consulted and plans were made for patient to under cystoscopy with bladder biopsy and TURBT. Risk assessment from cardiology and medicine determined patient is moderate risk and is currently optimized for the procedure. After admission, patient endorsed dysuria and UA was -ve, but UCx +ve, >100,000 CFU E. coli. Patient was treated with 3 days IV ceftriaxone and reports improvement of symptoms. Cystoscopy with biopsy and TURBT pending negative repeat urine culture.    #Recurrence of bladder cancer w/ concern for mets  #Hematuria  #Severe Right hydronephrosis  - CT A&P shows large infiltrative soft tissue mass in the pelvis involving bladder and appears to invade the uterus. Severe right sided hydronephrosis; adrenal nodule, splenic hypodensities and pulmonary nodule  - Urology eval appreciated. Recommend IR guided nephrostomy for right sided hydro  - per Dr. Mabry: cystoscopy and biopsy done at [facility] early next week if repeat UCx negative. f/u repeat Ucx  - prepare for transfer Sunday 7/16  - per cardio and medicine team: pt would be low risk for moderate risk procedure. Further cardiac workup is not indicated  - IR f/u for nephrostomy - no IR intervention at this time. If unable to stent for cysto with urology and require decompression of kidney, consult IR for potential PCN placement  - Onc eval once biopsy results, possibly outpatient  - Active T&S  - Bladder irrigation as per urology, flush every shift  - c/w IV ceftriaxone 1g q24h  - gutierrez placed, c/w gutierrez care  - c/w flomax    #Leucocytosis 2/2 Ca - resolved  - UA -ve for bacteria  - Urine Cx +ve E Coli, f/u repeat culture  - ceftriaxone 1g q24h - completed 7/13    #Extra and intrahepatic dilation  - No symptoms, LFTs wnl  - GGT WNL     #CKD 3b- stable- Cr from 2022 stable  #HTN  - On Lisinopril and HCTZ at home  - Held due to IV contrast. Resume lisinopril if renal function stable per BP

## 2023-07-15 NOTE — PROGRESS NOTE ADULT - ATTENDING COMMENTS
78-year-old female with history of bladder cancer (Diagnosed 10 years ago, s/p Intravesical therapy, No chemo or radiation, in remission), HTN and HLD presenting for Hematuria and decreased urine output. At baseline, patient is independent in ambulation. Imaging showing likely recurrence of bladder CA.    #Recurrence of bladder cancer w/ concern for mets  #Hematuria  #Severe Right hydronephrosis  - CT A&P shows large infiltrative soft tissue mass in the pelvis involving bladder and appears to invade the uterus. Severe right sided hydronephrosis; adrenal nodule, splenic hypodensities and pulmonary nodule  - low risk for moderate risk procedure. Further cardiac workup is not indicated  - s/p IV ceftriaxone 1g q24h  - continue gutierrez - frequent Bladder irrigation as per urology  - c/w flomax  - f/u repeat Ucx  - Urology - - per Dr. Mabry: cystoscopy and biopsy done at [facility] Monday 7/17 if repeat UCx negative.  - IR- IR guided PCN for right sided hydro if failure of cystoscopy   - keep Active T&S, monitor CBC    #CKD 3b- stable- Cr from 2022 stable  #HTN  - On Lisinopril and HCTZ at home  - lisinopril and HCTZ on hold  - continue amlodipine                                                                               # DVT prophylaxis: SCD  # GI prophylaxis: Not indicated  # Diet: DASH/TLC  # Activity: AAT  # Code status: Full code  # Disposition: Home    # Handoff:   - cystoscopy and biopsy done at [facility] with Dr. Mabry monday if repeat Ucx is negative  - f/u repeat Ucx

## 2023-07-15 NOTE — PROGRESS NOTE ADULT - SUBJECTIVE AND OBJECTIVE BOX
SUBJECTIVE:    Patient is a 78y old Female who presents with a chief complaint of Hematuria (12 Jul 2023 08:20)    Currently admitted to medicine with the primary diagnosis of Bladder mass       Today is hospital day 5d. This morning she is resting comfortably in bed and reports no new issues or overnight events. Patient states after lidocaine patch and tylenol, lower back pain has drastically improved.    PAST MEDICAL & SURGICAL HISTORY  Bladder cancer    Hypertension    Hyperlipidemia      SOCIAL HISTORY:  Negative for smoking/alcohol/drug use.     ALLERGIES:  No Known Allergies    MEDICATIONS:  STANDING MEDICATIONS  amLODIPine   Tablet 5 milliGRAM(s) Oral daily  chlorhexidine 2% Cloths 1 Application(s) Topical <User Schedule>  lactated ringers. 1000 milliLiter(s) IV Continuous <Continuous>  lidocaine   4% Patch 1 Patch Transdermal daily  polyethylene glycol 3350 17 Gram(s) Oral daily  senna 2 Tablet(s) Oral at bedtime  tamsulosin 0.4 milliGRAM(s) Oral at bedtime    PRN MEDICATIONS  acetaminophen     Tablet .. 650 milliGRAM(s) Oral every 6 hours PRN    VITALS:   T(F): 96.3  HR: 79  BP: 116/67  RR: 18  SpO2: --    LABS:                        12.4   7.42  )-----------( 280      ( 15 Jul 2023 07:01 )             37.8     07-15    143  |  109  |  18  ----------------------------<  108<H>  4.4   |  23  |  1.1    Ca    9.4      15 Jul 2023 07:01  Mg     1.8     07-15    TPro  5.3<L>  /  Alb  3.5  /  TBili  0.4  /  DBili  x   /  AST  15  /  ALT  11  /  AlkPhos  56  07-15      Urinalysis Basic - ( 15 Jul 2023 07:01 )    Color: x / Appearance: x / SG: x / pH: x  Gluc: 108 mg/dL / Ketone: x  / Bili: x / Urobili: x   Blood: x / Protein: x / Nitrite: x   Leuk Esterase: x / RBC: x / WBC x   Sq Epi: x / Non Sq Epi: x / Bacteria: x                RADIOLOGY:    PHYSICAL EXAM:    GENERAL: NAD, lying comfortably in bed  HEAD:  Atraumatic, Normocephalic  EYES: EOMI, sclera clear  ENT: Moist mucous membranes  NECK: Supple, trachea midline  CHEST/LUNG: Clear to auscultation anteriorly bilaterally; No rales, rhonchi, wheezing, or rubs. Unlabored respirations  HEART: Regular rate and rhythm; No appreciable murmurs, rubs, or gallops  ABDOMEN: (+)BS; Soft, nontender, no guarding, nondistended  EXTREMITIES:  2+ Radial Pulses, brisk capillary refill. No clubbing, cyanosis, or edema  NERVOUS SYSTEM:  A&Ox3, no noted facial droop. Moving all extremities w/o difficulty.    SUBJECTIVE:    Patient is a 78y old Female who presents with a chief complaint of Hematuria (12 Jul 2023 08:20)    Currently admitted to medicine with the primary diagnosis of Bladder mass       Today is hospital day 5d. This morning she is resting comfortably in bed and reports no new issues or overnight events. Patient states after lidocaine patch and tylenol, lower back pain has drastically improved.    PAST MEDICAL & SURGICAL HISTORY  Bladder cancer    Hypertension    Hyperlipidemia      SOCIAL HISTORY:  Negative for smoking/alcohol/drug use.     ALLERGIES:  No Known Allergies    MEDICATIONS:  STANDING MEDICATIONS  amLODIPine   Tablet 5 milliGRAM(s) Oral daily  chlorhexidine 2% Cloths 1 Application(s) Topical <User Schedule>  lactated ringers. 1000 milliLiter(s) IV Continuous <Continuous>  lidocaine   4% Patch 1 Patch Transdermal daily  polyethylene glycol 3350 17 Gram(s) Oral daily  senna 2 Tablet(s) Oral at bedtime  tamsulosin 0.4 milliGRAM(s) Oral at bedtime    PRN MEDICATIONS  acetaminophen     Tablet .. 650 milliGRAM(s) Oral every 6 hours PRN    VITALS:   T(F): 96.3  HR: 79  BP: 116/67  RR: 18  SpO2: --    LABS:                        12.4   7.42  )-----------( 280      ( 15 Jul 2023 07:01 )             37.8     07-15    143  |  109  |  18  ----------------------------<  108<H>  4.4   |  23  |  1.1    Ca    9.4      15 Jul 2023 07:01  Mg     1.8     07-15    TPro  5.3<L>  /  Alb  3.5  /  TBili  0.4  /  DBili  x   /  AST  15  /  ALT  11  /  AlkPhos  56  07-15      Urinalysis Basic - ( 15 Jul 2023 07:01 )    Color: x / Appearance: x / SG: x / pH: x  Gluc: 108 mg/dL / Ketone: x  / Bili: x / Urobili: x   Blood: x / Protein: x / Nitrite: x   Leuk Esterase: x / RBC: x / WBC x   Sq Epi: x / Non Sq Epi: x / Bacteria: x                RADIOLOGY:    < from: US Abdomen Upper Quadrant Right (07.12.23 @ 18:01) >  IMPRESSION:    No significant biliary ductal dilatation.    Severe right hydronephrosis partially imaged with cortical thinning.    < end of copied text >      PHYSICAL EXAM:    GENERAL: NAD, lying comfortably in bed  HEAD:  Atraumatic, Normocephalic  EYES: EOMI, sclera clear  ENT: Moist mucous membranes  NECK: Supple, trachea midline  CHEST/LUNG: Clear to auscultation anteriorly bilaterally; No rales, rhonchi, wheezing, or rubs. Unlabored respirations  HEART: Regular rate and rhythm; No appreciable murmurs, rubs, or gallops  ABDOMEN: (+)BS; Soft, nontender, no guarding, nondistended  EXTREMITIES:  2+ Radial Pulses, brisk capillary refill. No clubbing, cyanosis, or edema  NERVOUS SYSTEM:  A&Ox3, no noted facial droop. Moving all extremities w/o difficulty.

## 2023-07-15 NOTE — CHART NOTE - NSCHARTNOTEFT_GEN_A_CORE
Repeat UCx taken 7/14 resulted as NG.  Discussed with medical team, to prepare for transfer to [facility] tomorrow for scheduled cystoscopy, TURBT with Dr. Mabry on Monday 7/17. Repeat UCx taken 7/14 resulted as NG.  Discussed with medical team, to prepare for transfer to [facility] tomorrow for scheduled cystoscopy, TURBT with Dr. Mabry on Monday 7/17.    addendum:  Patient seen with and examined by Dr. Horne. Due to scheduling conflicts this note to be reviewed and amended where necessary by Dr. Horne later today. Repeat UCx taken 7/14 resulted as NG.  Discussed with medical team, to prepare for transfer to [facility] tomorrow for scheduled cystoscopy, TURBT with Dr. Mabry on Monday 7/17.    addendum:  Patient seen with and examined by Dr. Horne. Due to scheduling conflicts this note to be reviewed and amended where necessary by Dr. Horne later today.    pt seen lab results reviewed  f/u in AM for a final questions

## 2023-07-16 LAB
ALBUMIN SERPL ELPH-MCNC: 3.5 G/DL — SIGNIFICANT CHANGE UP (ref 3.5–5.2)
ALP SERPL-CCNC: 58 U/L — SIGNIFICANT CHANGE UP (ref 30–115)
ALT FLD-CCNC: 12 U/L — SIGNIFICANT CHANGE UP (ref 0–41)
ANION GAP SERPL CALC-SCNC: 9 MMOL/L — SIGNIFICANT CHANGE UP (ref 7–14)
AST SERPL-CCNC: 14 U/L — SIGNIFICANT CHANGE UP (ref 0–41)
BASOPHILS # BLD AUTO: 0.06 K/UL — SIGNIFICANT CHANGE UP (ref 0–0.2)
BASOPHILS NFR BLD AUTO: 0.7 % — SIGNIFICANT CHANGE UP (ref 0–1)
BILIRUB SERPL-MCNC: 0.4 MG/DL — SIGNIFICANT CHANGE UP (ref 0.2–1.2)
BUN SERPL-MCNC: 21 MG/DL — HIGH (ref 10–20)
CALCIUM SERPL-MCNC: 9 MG/DL — SIGNIFICANT CHANGE UP (ref 8.4–10.5)
CHLORIDE SERPL-SCNC: 110 MMOL/L — SIGNIFICANT CHANGE UP (ref 98–110)
CO2 SERPL-SCNC: 23 MMOL/L — SIGNIFICANT CHANGE UP (ref 17–32)
CREAT SERPL-MCNC: 1 MG/DL — SIGNIFICANT CHANGE UP (ref 0.7–1.5)
CULTURE RESULTS: NO GROWTH — SIGNIFICANT CHANGE UP
EGFR: 57 ML/MIN/1.73M2 — LOW
EOSINOPHIL # BLD AUTO: 0.33 K/UL — SIGNIFICANT CHANGE UP (ref 0–0.7)
EOSINOPHIL NFR BLD AUTO: 3.6 % — SIGNIFICANT CHANGE UP (ref 0–8)
GLUCOSE SERPL-MCNC: 109 MG/DL — HIGH (ref 70–99)
HCT VFR BLD CALC: 40 % — SIGNIFICANT CHANGE UP (ref 37–47)
HGB BLD-MCNC: 13 G/DL — SIGNIFICANT CHANGE UP (ref 12–16)
IMM GRANULOCYTES NFR BLD AUTO: 0.8 % — HIGH (ref 0.1–0.3)
LYMPHOCYTES # BLD AUTO: 1.89 K/UL — SIGNIFICANT CHANGE UP (ref 1.2–3.4)
LYMPHOCYTES # BLD AUTO: 20.6 % — SIGNIFICANT CHANGE UP (ref 20.5–51.1)
MAGNESIUM SERPL-MCNC: 1.9 MG/DL — SIGNIFICANT CHANGE UP (ref 1.8–2.4)
MCHC RBC-ENTMCNC: 28.2 PG — SIGNIFICANT CHANGE UP (ref 27–31)
MCHC RBC-ENTMCNC: 32.5 G/DL — SIGNIFICANT CHANGE UP (ref 32–37)
MCV RBC AUTO: 86.8 FL — SIGNIFICANT CHANGE UP (ref 81–99)
MONOCYTES # BLD AUTO: 0.64 K/UL — HIGH (ref 0.1–0.6)
MONOCYTES NFR BLD AUTO: 7 % — SIGNIFICANT CHANGE UP (ref 1.7–9.3)
NEUTROPHILS # BLD AUTO: 6.19 K/UL — SIGNIFICANT CHANGE UP (ref 1.4–6.5)
NEUTROPHILS NFR BLD AUTO: 67.3 % — SIGNIFICANT CHANGE UP (ref 42.2–75.2)
NRBC # BLD: 0 /100 WBCS — SIGNIFICANT CHANGE UP (ref 0–0)
PLATELET # BLD AUTO: 332 K/UL — SIGNIFICANT CHANGE UP (ref 130–400)
PMV BLD: 9.1 FL — SIGNIFICANT CHANGE UP (ref 7.4–10.4)
POTASSIUM SERPL-MCNC: 4 MMOL/L — SIGNIFICANT CHANGE UP (ref 3.5–5)
POTASSIUM SERPL-SCNC: 4 MMOL/L — SIGNIFICANT CHANGE UP (ref 3.5–5)
PROT SERPL-MCNC: 5.5 G/DL — LOW (ref 6–8)
RBC # BLD: 4.61 M/UL — SIGNIFICANT CHANGE UP (ref 4.2–5.4)
RBC # FLD: 14.6 % — HIGH (ref 11.5–14.5)
SODIUM SERPL-SCNC: 142 MMOL/L — SIGNIFICANT CHANGE UP (ref 135–146)
SPECIMEN SOURCE: SIGNIFICANT CHANGE UP
WBC # BLD: 9.18 K/UL — SIGNIFICANT CHANGE UP (ref 4.8–10.8)
WBC # FLD AUTO: 9.18 K/UL — SIGNIFICANT CHANGE UP (ref 4.8–10.8)

## 2023-07-16 PROCEDURE — 71045 X-RAY EXAM CHEST 1 VIEW: CPT | Mod: 26

## 2023-07-16 PROCEDURE — 99232 SBSQ HOSP IP/OBS MODERATE 35: CPT

## 2023-07-16 PROCEDURE — 99232 SBSQ HOSP IP/OBS MODERATE 35: CPT | Mod: 57

## 2023-07-16 RX ADMIN — Medication 650 MILLIGRAM(S): at 08:41

## 2023-07-16 RX ADMIN — Medication 650 MILLIGRAM(S): at 01:15

## 2023-07-16 RX ADMIN — Medication 650 MILLIGRAM(S): at 00:18

## 2023-07-16 RX ADMIN — LIDOCAINE 1 PATCH: 4 CREAM TOPICAL at 19:38

## 2023-07-16 RX ADMIN — AMLODIPINE BESYLATE 5 MILLIGRAM(S): 2.5 TABLET ORAL at 06:02

## 2023-07-16 RX ADMIN — CHLORHEXIDINE GLUCONATE 1 APPLICATION(S): 213 SOLUTION TOPICAL at 06:04

## 2023-07-16 RX ADMIN — SODIUM CHLORIDE 75 MILLILITER(S): 9 INJECTION, SOLUTION INTRAVENOUS at 21:28

## 2023-07-16 RX ADMIN — Medication 650 MILLIGRAM(S): at 19:00

## 2023-07-16 RX ADMIN — TAMSULOSIN HYDROCHLORIDE 0.4 MILLIGRAM(S): 0.4 CAPSULE ORAL at 21:28

## 2023-07-16 RX ADMIN — Medication 650 MILLIGRAM(S): at 09:11

## 2023-07-16 RX ADMIN — LIDOCAINE 1 PATCH: 4 CREAM TOPICAL at 23:24

## 2023-07-16 RX ADMIN — LIDOCAINE 1 PATCH: 4 CREAM TOPICAL at 13:52

## 2023-07-16 RX ADMIN — LIDOCAINE 1 PATCH: 4 CREAM TOPICAL at 01:15

## 2023-07-16 RX ADMIN — Medication 650 MILLIGRAM(S): at 18:30

## 2023-07-16 NOTE — PROGRESS NOTE ADULT - ASSESSMENT
78-year-old female with history of bladder cancer (Diagnosed 10 years ago, s/p Intravesical therapy, No chemo or radiation, in remission), HTN and HLD presenting for Hematuria and decreased urine output. At baseline, patient is independent in ambulation. Imaging showing likely recurrence of bladder CA.    #Recurrence of bladder cancer w/ concern for mets  #Hematuria  #Severe Right hydronephrosis  - CT A&P shows large infiltrative soft tissue mass in the pelvis involving bladder and appears to invade the uterus. Severe right sided hydronephrosis; adrenal nodule, splenic hypodensities and pulmonary nodule  - low risk for moderate risk procedure. Further cardiac workup is not indicated  - s/p IV ceftriaxone 1g q24h  - continue gutierrez - frequent Bladder irrigation as per urology  - c/w flomax  - f/u repeat Ucx  - Urology - - per Dr. Mabry: cystoscopy and biopsy done at [facility] Monday 7/17 if repeat UCx negative.  - IR- IR guided PCN for right sided hydro if failure of cystoscopy   - keep Active T&S, monitor CBC    #CKD 3b- stable- Cr from 2022 stable  #HTN  - On Lisinopril and HCTZ at home  - lisinopril and HCTZ on hold  - continue amlodipine                                                                               # DVT prophylaxis: SCD  # GI prophylaxis: Not indicated  # Diet: DASH/TLC  # Activity: AAT  # Code status: Full code  # Disposition: Home    # Handoff:   - cystoscopy and biopsy done at [facility] with Dr. Mabry monday if repeat Ucx is negative  - f/u repeat Ucx .

## 2023-07-16 NOTE — PROGRESS NOTE ADULT - SUBJECTIVE AND OBJECTIVE BOX
Urology Preop Note     Diagnosis: Right severe hydro to level of Bladder Mass   Procedure: cystoscopy, TURBT at [facility] 7/17   Surgeon: Dr. Mabry     S: 79 yo F with PMH of High grade urothelial cancer admitted with RIGHT hydronephrosis to level of a large pelvic mass. Patient seen and examined at bedside. Patient reports mild bladder spasm. Denies any fever, chills, SOB/difficulty breathing, abdominal pain, flank pain. 16 fr in place draining blood-tinged urine with small clots. No acute overnight events.       O:  T(C): 36 (07-16-23 @ 05:58), Max: 36.8 (07-15-23 @ 20:00)  HR: 69 (07-16-23 @ 05:58) (69 - 88)  BP: 145/72 (07-16-23 @ 05:58) (102/74 - 145/72)  RR: 19 (07-16-23 @ 05:58) (18 - 19)  SpO2: 99% (07-16-23 @ 05:58) (97% - 99%)    GEN: NAD, Patient lying comfortably in bed without any issues   SKIN: Good color, non diaphoretic.  HEENT: NC/AT.  RESP: No use of accessory muscles.  CARDIO: +S1/S2  ABDO: Soft, NT/ND, no palpable bladder, no suprapubic tenderness  BACK: No CVAT B/L  :  + Indwelling gutierrez in place, draining blood-tinged urine with small clots.                           13.0   9.18  )-----------( 332      ( 16 Jul 2023 07:17 )             40.0   07-16    142  |  110  |  21<H>  ----------------------------<  109<H>  4.0   |  23  |  1.0    Ca    9.0      16 Jul 2023 07:17  Mg     1.9     07-16    TPro  5.5<L>  /  Alb  3.5  /  TBili  0.4  /  DBili  x   /  AST  14  /  ALT  12  /  AlkPhos  58  07-16      Ucx:  Culture - Urine in AM (07.14.23 @ 04:30)    Specimen Source: Catheterized Catheterized   Culture Results:   No growth      EKG:  < from: 12 Lead ECG (07.10.23 @ 17:18) >    Ventricular Rate 81 BPM    Atrial Rate 81 BPM    P-R Interval 166 ms    QRS Duration 96 ms    Q-T Interval 396 ms    QTC Calculation(Bazett) 460 ms    P Axis 78 degrees    R Axis -36 degrees    T Axis 34 degrees    Diagnosis Line Normal sinus rhythm with sinus arrhythmia  Left axis deviation  Abnormal ECG    Confirmed by Moira Caal MD [id number]) on 7/12/2023 8:11:11 AM    < end of copied text >        78yFemale admitted for Right hydronephrosis to level of large pelvic mass , going to OR for cystoscopy, TURBT with Dr. Mabry at [facility] for 7/17     - Transfer to [facility] today   - NPO after MN for procedure tomorrow   - Continue Irrigation 120cc of SW qshit/prn   - Monitor I&Os  - CXR   - IV fluids  - 2 units pRBC on hold  - spoke with RN   - Will d/w attending    Urology Preop Note     Diagnosis: Right severe hydro to level of Bladder Mass   Procedure: cystoscopy, TURBT at [facility] 7/17   Surgeon: Dr. Mabry     S: 77 yo F with PMH of High grade urothelial cancer admitted with RIGHT hydronephrosis to level of a large pelvic mass. Patient seen and examined at bedside. Patient reports mild bladder spasm. Denies any fever, chills, SOB/difficulty breathing, abdominal pain, flank pain. 16 fr in place draining blood-tinged urine with small clots. No acute overnight events.       O:  T(C): 36 (07-16-23 @ 05:58), Max: 36.8 (07-15-23 @ 20:00)  HR: 69 (07-16-23 @ 05:58) (69 - 88)  BP: 145/72 (07-16-23 @ 05:58) (102/74 - 145/72)  RR: 19 (07-16-23 @ 05:58) (18 - 19)  SpO2: 99% (07-16-23 @ 05:58) (97% - 99%)    GEN: NAD, Patient lying comfortably in bed without any issues   SKIN: Good color, non diaphoretic.  HEENT: NC/AT.  RESP: No use of accessory muscles.  CARDIO: +S1/S2  ABDO: Soft, NT/ND, no palpable bladder, no suprapubic tenderness  BACK: No CVAT B/L  :  + Indwelling gutierrez in place, draining blood-tinged urine with small clots.                           13.0   9.18  )-----------( 332      ( 16 Jul 2023 07:17 )             40.0   07-16    142  |  110  |  21<H>  ----------------------------<  109<H>  4.0   |  23  |  1.0    Ca    9.0      16 Jul 2023 07:17  Mg     1.9     07-16    TPro  5.5<L>  /  Alb  3.5  /  TBili  0.4  /  DBili  x   /  AST  14  /  ALT  12  /  AlkPhos  58  07-16      Ucx:  Culture - Urine in AM (07.14.23 @ 04:30)    Specimen Source: Catheterized Catheterized   Culture Results:   No growth      EKG:  < from: 12 Lead ECG (07.10.23 @ 17:18) >    Ventricular Rate 81 BPM    Atrial Rate 81 BPM    P-R Interval 166 ms    QRS Duration 96 ms    Q-T Interval 396 ms    QTC Calculation(Bazett) 460 ms    P Axis 78 degrees    R Axis -36 degrees    T Axis 34 degrees    Diagnosis Line Normal sinus rhythm with sinus arrhythmia  Left axis deviation  Abnormal ECG    Confirmed by Moira Caal MD [id number]) on 7/12/2023 8:11:11 AM    < end of copied text >        78yFemale admitted for Right hydronephrosis to level of large pelvic mass , going to OR for cystoscopy, TURBT with Dr. Mabry at [facility] for 7/17     - Transfer to [facility] today   - NPO after MN for procedure tomorrow   - Continue Irrigation 120cc of SW qshit/prn   - Monitor I&Os  - CXR   - IV fluids  - 2 units pRBC on hold  - spoke with RN   - Will d/w attending     Addendum   - spoke to Dr. Mendoza regarding transfer to [facility]    Urology Preop Note     Diagnosis: Right severe hydro to level of Bladder Mass   Procedure: cystoscopy, TURBT at [facility] on 7/17   Surgeon: Dr. Mabry     S: 77 yo F with PMH of High grade urothelial cancer admitted with RIGHT hydronephrosis to level of a large pelvic mass. Patient seen and examined at bedside. Patient reports mild bladder spasm. Denies any fever, chills, SOB/difficulty breathing, abdominal pain, flank pain. 16 fr in place draining blood-tinged urine with small clots. No acute overnight events.       O:  T(C): 36 (07-16-23 @ 05:58), Max: 36.8 (07-15-23 @ 20:00)  HR: 69 (07-16-23 @ 05:58) (69 - 88)  BP: 145/72 (07-16-23 @ 05:58) (102/74 - 145/72)  RR: 19 (07-16-23 @ 05:58) (18 - 19)  SpO2: 99% (07-16-23 @ 05:58) (97% - 99%)    GEN: NAD, Patient lying comfortably in bed without any issues   SKIN: Good color, non diaphoretic.  HEENT: NC/AT.  RESP: No use of accessory muscles.  CARDIO: +S1/S2  ABDO: Soft, NT/ND, no palpable bladder, no suprapubic tenderness  BACK: No CVAT B/L  :  + Indwelling gutierrez in place, draining blood-tinged urine with small clots.                           13.0   9.18  )-----------( 332      ( 16 Jul 2023 07:17 )             40.0   07-16    142  |  110  |  21<H>  ----------------------------<  109<H>  4.0   |  23  |  1.0    Ca    9.0      16 Jul 2023 07:17  Mg     1.9     07-16    TPro  5.5<L>  /  Alb  3.5  /  TBili  0.4  /  DBili  x   /  AST  14  /  ALT  12  /  AlkPhos  58  07-16      Ucx:  Culture - Urine in AM (07.14.23 @ 04:30)    Specimen Source: Catheterized Catheterized   Culture Results:   No growth      EKG:  < from: 12 Lead ECG (07.10.23 @ 17:18) >    Ventricular Rate 81 BPM    Atrial Rate 81 BPM    P-R Interval 166 ms    QRS Duration 96 ms    Q-T Interval 396 ms    QTC Calculation(Bazett) 460 ms    P Axis 78 degrees    R Axis -36 degrees    T Axis 34 degrees    Diagnosis Line Normal sinus rhythm with sinus arrhythmia  Left axis deviation  Abnormal ECG    Confirmed by Moira Caal MD ([id number]) on 7/12/2023 8:11:11 AM    < end of copied text >        78yFemale admitted for Right hydronephrosis to level of large pelvic mass , going to OR for cystoscopy, TURBT with Dr. Mabry at [facility] for 7/17     - Transfer to [facility] today   - NPO after MN for procedure tomorrow   - Continue Irrigation 120cc of SW qshit/prn   - Monitor I&Os  - CXR   - IV fluids  - 2 units pRBC on hold  - spoke with RN   - Will d/w attending     Addendum   - spoke to Dr. Mendoza regarding transfer to [facility]

## 2023-07-16 NOTE — PROGRESS NOTE ADULT - ASSESSMENT
She had numerous questions most of which cannot be answered until we see what is found on cystoscopy.  I reviewed with her that if we cannot get up from below she may still need a percutaneous nephrostomy for now that is on hold.  As far as future treatments will depend on the pathology as well as the extent of the disease.  For now the plan is to [facility] if they cannot get in with a stent and she may need a percutaneous nephrostomy she may be transferred back to [facility].  All questions were answered and she is agreeing to proceed.

## 2023-07-16 NOTE — PROGRESS NOTE ADULT - SUBJECTIVE AND OBJECTIVE BOX
SUBJECTIVE:    Patient is a 78y old Female who presents with a chief complaint of Gross hematuria, bladder mass (16 Jul 2023 08:16)    Currently admitted to medicine with the primary diagnosis of:    Today is hospital day 6d.     Overnight Events:         PAST MEDICAL & SURGICAL HISTORY  Bladder cancer    Hypertension    Hyperlipidemia        SOCIAL HISTORY:  Smoking history:  Alcohol Use;  Illicit Drug Use:    ALLERGIES:  No Known Allergies    MEDICATIONS:  STANDING MEDICATIONS  amLODIPine   Tablet 5 milliGRAM(s) Oral daily  chlorhexidine 2% Cloths 1 Application(s) Topical <User Schedule>  lactated ringers. 1000 milliLiter(s) IV Continuous <Continuous>  lidocaine   4% Patch 1 Patch Transdermal daily  polyethylene glycol 3350 17 Gram(s) Oral daily  senna 2 Tablet(s) Oral at bedtime  tamsulosin 0.4 milliGRAM(s) Oral at bedtime    PRN MEDICATIONS  acetaminophen     Tablet .. 650 milliGRAM(s) Oral every 6 hours PRN    VITALS:   ICU Vital Signs Last 24 Hrs  T(C): 37.1 (16 Jul 2023 17:41), Max: 37.1 (16 Jul 2023 17:41)  T(F): 98.7 (16 Jul 2023 17:41), Max: 98.7 (16 Jul 2023 17:41)  HR: 95 (16 Jul 2023 17:41) (69 - 95)  BP: 128/85 (16 Jul 2023 17:41) (102/74 - 145/72)  BP(mean): 83 (16 Jul 2023 05:58) (83 - 83)  RR: 18 (16 Jul 2023 17:41) (18 - 19)  SpO2: 97% (16 Jul 2023 17:41) (97% - 99%)    O2 Parameters below as of 16 Jul 2023 17:41  Patient On (Oxygen Delivery Method): room air            LABS:                        13.0   9.18  )-----------( 332      ( 16 Jul 2023 07:17 )             40.0     07-16    142  |  110  |  21<H>  ----------------------------<  109<H>  4.0   |  23  |  1.0    Ca    9.0      16 Jul 2023 07:17  Mg     1.9     07-16    TPro  5.5<L>  /  Alb  3.5  /  TBili  0.4  /  DBili  x   /  AST  14  /  ALT  12  /  AlkPhos  58  07-16      Urinalysis Basic - ( 16 Jul 2023 07:17 )    Color: x / Appearance: x / SG: x / pH: x  Gluc: 109 mg/dL / Ketone: x  / Bili: x / Urobili: x   Blood: x / Protein: x / Nitrite: x   Leuk Esterase: x / RBC: x / WBC x   Sq Epi: x / Non Sq Epi: x / Bacteria: x            Culture - Urine (collected 14 Jul 2023 10:20)  Source: Catheterized Catheterized  Preliminary Report (16 Jul 2023 12:56):    Culture in progress    Culture - Urine (collected 14 Jul 2023 04:30)  Source: Catheterized Catheterized  Final Report (15 Jul 2023 15:26):    No growth      RADIOLOGY:        PHYSICAL EXAM:  GENERAL: NAD, lying in bed comfortably  CHEST/LUNG: Clear to auscultation bilaterally; No rales, rhonchi, wheezing, or rubs. Unlabored respirations  HEART: Regular rate and rhythm; No murmurs, rubs, or gallops  ABDOMEN: Bowel sounds present; Soft, Nontender, Nondistended. No hepatomegally  EXTREMITIES:  2+ Peripheral Pulses, brisk capillary refill. No clubbing, cyanosis, or edema  NERVOUS SYSTEM:  Alert & Oriented X3, speech clear. No deficits   MSK: FROM all 4 extremities, full and equal strength  SKIN: No rashes or lesions      Lines:  Central line:              Date placed:             Indication:   Intravenous Access:   NG tube:   Arteaga Catheter:   Indwelling Urethral Catheter:     Connect To:  Straight Drainage/[place]    Indication:  Urologic Procedure,  Surgery (07-14-23 @ 08:04) (not performed) [Active]

## 2023-07-17 ENCOUNTER — APPOINTMENT (OUTPATIENT)
Dept: UROLOGY | Facility: HOSPITAL | Age: 78
End: 2023-07-17

## 2023-07-17 ENCOUNTER — RESULT REVIEW (OUTPATIENT)
Age: 78
End: 2023-07-17

## 2023-07-17 PROBLEM — I10 ESSENTIAL (PRIMARY) HYPERTENSION: Chronic | Status: ACTIVE | Noted: 2023-07-10

## 2023-07-17 PROBLEM — E78.5 HYPERLIPIDEMIA, UNSPECIFIED: Chronic | Status: ACTIVE | Noted: 2023-07-10

## 2023-07-17 PROBLEM — C67.9 MALIGNANT NEOPLASM OF BLADDER, UNSPECIFIED: Chronic | Status: ACTIVE | Noted: 2023-07-10

## 2023-07-17 PROCEDURE — 88307 TISSUE EXAM BY PATHOLOGIST: CPT | Mod: 26

## 2023-07-17 PROCEDURE — 99232 SBSQ HOSP IP/OBS MODERATE 35: CPT

## 2023-07-17 PROCEDURE — 88360 TUMOR IMMUNOHISTOCHEM/MANUAL: CPT | Mod: 26

## 2023-07-17 PROCEDURE — 88313 SPECIAL STAINS GROUP 2: CPT | Mod: 26

## 2023-07-17 PROCEDURE — 52235 CYSTOSCOPY AND TREATMENT: CPT

## 2023-07-17 PROCEDURE — 88342 IMHCHEM/IMCYTCHM 1ST ANTB: CPT | Mod: 26,59

## 2023-07-17 PROCEDURE — 88341 IMHCHEM/IMCYTCHM EA ADD ANTB: CPT | Mod: 26,59

## 2023-07-17 RX ORDER — AMLODIPINE BESYLATE 2.5 MG/1
5 TABLET ORAL DAILY
Refills: 0 | Status: DISCONTINUED | OUTPATIENT
Start: 2023-07-17 | End: 2023-07-18

## 2023-07-17 RX ORDER — POLYETHYLENE GLYCOL 3350 17 G/17G
17 POWDER, FOR SOLUTION ORAL DAILY
Refills: 0 | Status: DISCONTINUED | OUTPATIENT
Start: 2023-07-17 | End: 2023-07-18

## 2023-07-17 RX ORDER — ACETAMINOPHEN 500 MG
650 TABLET ORAL EVERY 6 HOURS
Refills: 0 | Status: DISCONTINUED | OUTPATIENT
Start: 2023-07-17 | End: 2023-07-18

## 2023-07-17 RX ORDER — ONDANSETRON 8 MG/1
4 TABLET, FILM COATED ORAL ONCE
Refills: 0 | Status: DISCONTINUED | OUTPATIENT
Start: 2023-07-17 | End: 2023-07-17

## 2023-07-17 RX ORDER — SODIUM CHLORIDE 9 MG/ML
1000 INJECTION, SOLUTION INTRAVENOUS
Refills: 0 | Status: DISCONTINUED | OUTPATIENT
Start: 2023-07-17 | End: 2023-07-17

## 2023-07-17 RX ORDER — LIDOCAINE 4 G/100G
1 CREAM TOPICAL DAILY
Refills: 0 | Status: DISCONTINUED | OUTPATIENT
Start: 2023-07-17 | End: 2023-07-18

## 2023-07-17 RX ORDER — PHENAZOPYRIDINE HCL 100 MG
100 TABLET ORAL ONCE
Refills: 0 | Status: COMPLETED | OUTPATIENT
Start: 2023-07-17 | End: 2023-07-17

## 2023-07-17 RX ORDER — HYDROMORPHONE HYDROCHLORIDE 2 MG/ML
0.25 INJECTION INTRAMUSCULAR; INTRAVENOUS; SUBCUTANEOUS
Refills: 0 | Status: DISCONTINUED | OUTPATIENT
Start: 2023-07-17 | End: 2023-07-17

## 2023-07-17 RX ADMIN — AMLODIPINE BESYLATE 5 MILLIGRAM(S): 2.5 TABLET ORAL at 06:01

## 2023-07-17 RX ADMIN — SODIUM CHLORIDE 75 MILLILITER(S): 9 INJECTION, SOLUTION INTRAVENOUS at 11:11

## 2023-07-17 RX ADMIN — CHLORHEXIDINE GLUCONATE 1 APPLICATION(S): 213 SOLUTION TOPICAL at 06:00

## 2023-07-17 RX ADMIN — Medication 650 MILLIGRAM(S): at 18:30

## 2023-07-17 RX ADMIN — LIDOCAINE 1 PATCH: 4 CREAM TOPICAL at 18:27

## 2023-07-17 RX ADMIN — Medication 650 MILLIGRAM(S): at 00:08

## 2023-07-17 RX ADMIN — Medication 650 MILLIGRAM(S): at 06:04

## 2023-07-17 RX ADMIN — Medication 650 MILLIGRAM(S): at 01:00

## 2023-07-17 RX ADMIN — Medication 650 MILLIGRAM(S): at 08:03

## 2023-07-17 RX ADMIN — Medication 100 MILLIGRAM(S): at 18:00

## 2023-07-17 RX ADMIN — Medication 650 MILLIGRAM(S): at 12:18

## 2023-07-17 RX ADMIN — LIDOCAINE 1 PATCH: 4 CREAM TOPICAL at 11:54

## 2023-07-17 RX ADMIN — Medication 650 MILLIGRAM(S): at 11:58

## 2023-07-17 NOTE — BRIEF OPERATIVE NOTE - NSICDXBRIEFPROCEDURE_GEN_ALL_CORE_FT
PROCEDURES:  Cystoscopy with fulguration and resection of bladder tumor 17-Jul-2023 16:56:33  Shania Mabry

## 2023-07-17 NOTE — PROGRESS NOTE ADULT - ASSESSMENT
78yFemale admitted for Right hydronephrosis to level of large pelvic mass , going to OR for cystoscopy, TURBT with Dr. Mabry at [facility] for 7/17     #Bladder mass   - For OR today   - keep Npo

## 2023-07-17 NOTE — PROGRESS NOTE ADULT - SUBJECTIVE AND OBJECTIVE BOX
S: pt without new complaints other than irritation from gutierrez which is draining blood tinged yellow urine. For TURBT today  O; Vital Signs Last 24 Hrs  T(C): 36.1 (17 Jul 2023 05:45), Max: 37.1 (16 Jul 2023 17:41)  T(F): 96.9 (17 Jul 2023 05:45), Max: 98.7 (16 Jul 2023 17:41)  HR: 64 (17 Jul 2023 05:45) (64 - 95)  BP: 162/72 (17 Jul 2023 05:45) (128/85 - 162/72)  BP(mean): --  RR: 20 (17 Jul 2023 05:45) (18 - 20)  SpO2: 98% (16 Jul 2023 20:30) (97% - 98%)    Parameters below as of 16 Jul 2023 17:41  Patient On (Oxygen Delivery Method): room air    MEDICATIONS  (STANDING):  amLODIPine   Tablet 5 milliGRAM(s) Oral daily  chlorhexidine 2% Cloths 1 Application(s) Topical <User Schedule>  lactated ringers. 1000 milliLiter(s) (75 mL/Hr) IV Continuous <Continuous>  lidocaine   4% Patch 1 Patch Transdermal daily  polyethylene glycol 3350 17 Gram(s) Oral daily  senna 2 Tablet(s) Oral at bedtime  tamsulosin 0.4 milliGRAM(s) Oral at bedtime    MEDICATIONS  (PRN):  acetaminophen     Tablet .. 650 milliGRAM(s) Oral every 6 hours PRN Mild Pain (1 - 3), Moderate Pain (4 - 6)    EXAM:  lungs: cta  cvs: s1s2  abd: soft, NT/ND  : gutierrez with blood tinged urine    LABS; NO NEW RESULTS TODAY    Labs:  CAPILLARY BLOOD GLUCOSE                              13.0   9.18  )-----------( 332      ( 16 Jul 2023 07:17 )             40.0         07-16    142  |  110  |  21<H>  ----------------------------<  109<H>  4.0   |  23  |  1.0          LFTs:             5.5  | 0.4  | 14       ------------------[58      ( 16 Jul 2023 07:17 )  3.5  | x    | 12          Lipase:x      Amylase:x             Coags:        Urinalysis Basic - ( 16 Jul 2023 07:17 )    Color: x / Appearance: x / SG: x / pH: x  Gluc: 109 mg/dL / Ketone: x  / Bili: x / Urobili: x   Blood: x / Protein: x / Nitrite: x   Leuk Esterase: x / RBC: x / WBC x   Sq Epi: x / Non Sq Epi: x / Bacteria: x

## 2023-07-17 NOTE — CHART NOTE - NSCHARTNOTEFT_GEN_A_CORE
Vital Signs Last 24 Hrs  T(C): 35.8 (17 Jul 2023 21:12), Max: 36.9 (17 Jul 2023 17:08)  T(F): 96.5 (17 Jul 2023 21:12), Max: 98.4 (17 Jul 2023 17:08)  HR: 94 (17 Jul 2023 21:12) (62 - 94)  BP: 118/71 (17 Jul 2023 21:12) (118/71 - 169/81)  BP(mean): 83 (17 Jul 2023 14:47) (83 - 83)  RR: 18 (17 Jul 2023 21:12) (14 - 20)  SpO2: 96% (17 Jul 2023 17:53) (95% - 98%)  Parameters below as of 17 Jul 2023 17:08  Patient On (Oxygen Delivery Method): room air  Pt is S/P TURBT POD#0.  Pt without complaints.  Gutierrez BSD with light red output, no clots  - Continue current management  - Can be D/C Home 7/18 if stable  - d/c gutierrez 7/18 prior to D/C  - F/U in one week.  - No Rx's needed.

## 2023-07-17 NOTE — CHART NOTE - NSCHARTNOTEFT_GEN_A_CORE
PACU ANESTHESIA ADMISSION NOTE      Procedure: cysto , turbt    ____  Intubated  TV:______       Rate: ______      FiO2: ______    ___x_  Patent Airway    __x__  Full return of protective reflexes    __x__  Full recovery from anesthesia / back to baseline     Vitals:   T:     98.4     R:    15             BP:   157/80          Sat:        97         P:90      Mental Status:  ___x_ Awake   _____ Alert   _____ Drowsy   _____ Sedated    Nausea/Vomiting:  __x__ NO  ______Yes,   See Post - Op Orders          Pain Scale (0-10):  _0____    Treatment: ____ None    ____ See Post - Op/PCA Orders    Post - Operative Fluids:   ____ Oral   ___x_ See Post - Op Orders    Plan: Discharge:   __Home       _x____Floor     _____Critical Care    _____  Other:_________________    Comments:

## 2023-07-17 NOTE — CHART NOTE - NSCHARTNOTEFT_GEN_A_CORE
Per PACU RN, pt c/o burning due to gutierrez - requesting pyridium order  Pyridium 100 mg x 1 dose ordered

## 2023-07-17 NOTE — PROGRESS NOTE ADULT - SUBJECTIVE AND OBJECTIVE BOX
[place], [place]  78y  Female      Patient is a 78y old  Female who presents with a chief complaint of Hematuria (16 Jul 2023 18:14)      INTERVAL HPI/OVERNIGHT EVENTS:  Patient seen and examined earlier this morning; patient denies any new complaints; denies F/C, CP, palpitations, SOB, N/V, abd pain. She reports she is aware of the plan for OR for today.      REVIEW OF SYSTEMS:  CONSTITUTIONAL: No fever, weight loss, or fatigue  EYES: No eye pain, visual disturbances, or discharge  ENMT:  No difficulty hearing, tinnitus, vertigo; No sinus or throat pain  NECK: No pain or stiffness  RESPIRATORY: No cough, wheezing, chills or hemoptysis; No shortness of breath  CARDIOVASCULAR: No chest pain, palpitations, dizziness, or leg swelling  GASTROINTESTINAL: No abdominal or epigastric pain. No nausea, vomiting, or hematemesis; No diarrhea or constipation.  GENITOURINARY: No dysuria, frequency, hematuria, or incontinence  NEUROLOGICAL: No headaches, memory loss, loss of strength, numbness, or tremors  MUSCULOSKELETAL: No joint pain or swelling; No muscle, back, or extremity pain      T(C): 36.1 (07-17-23 @ 05:45), Max: 37.1 (07-16-23 @ 17:41)  HR: 64 (07-17-23 @ 05:45) (64 - 95)  BP: 162/72 (07-17-23 @ 05:45) (128/85 - 162/72)  RR: 20 (07-17-23 @ 05:45) (18 - 20)  SpO2: 98% (07-16-23 @ 20:30) (97% - 98%)    PHYSICAL EXAM:  GENERAL: NAD, well-groomed, well-developed  HEAD:  Atraumatic, Normocephalic  EYES: EOMI, PERRLA, conjunctiva and sclera clear  ENMT: No tonsillar erythema, exudates, or enlargement; Moist mucous membranes  NECK: Supple, No JVD, Normal thyroid  NERVOUS SYSTEM:  Alert & Oriented X3, Good concentration; Motor Strength 5/5 B/L upper and lower extremities  CHEST/LUNG: Clear to percussion bilaterally; No rales, rhonchi, wheezing, or rubs  HEART: Regular rate and rhythm; No murmurs, rubs, or gallops  ABDOMEN: Soft, Nontender, Nondistended; Bowel sounds present  : +gutierrez with blood tinged urine  EXTREMITIES:  2+ Peripheral Pulses, No clubbing, cyanosis, or edema      Consultant(s) Notes Reviewed:  [x ] YES  [ ] NO  Care Discussed with Consultants/Other Providers [ x] YES  [ ] NO    LAB:                        13.0   9.18  )-----------( 332      ( 16 Jul 2023 07:17 )             40.0     07-16    142  |  110  |  21<H>  ----------------------------<  109<H>  4.0   |  23  |  1.0    Ca    9.0      16 Jul 2023 07:17  Mg     1.9     07-16    TPro  5.5<L>  /  Alb  3.5  /  TBili  0.4  /  DBili  x   /  AST  14  /  ALT  12  /  AlkPhos  58  07-16    LIVER FUNCTIONS - ( 16 Jul 2023 07:17 )  Alb: 3.5 g/dL / Pro: 5.5 g/dL / ALK PHOS: 58 U/L / ALT: 12 U/L / AST: 14 U/L / GGT: x                       Drug Dosing Weight  Height (cm): 157.5 (16 Jul 2023 20:30)  Weight (kg): 68.2 (16 Jul 2023 20:30)  BMI (kg/m2): 27.5 (16 Jul 2023 20:30)  BSA (m2): 1.69 (16 Jul 2023 20:30)  Height (cm): 157.5 (07-16-23 @ 20:30)  Weight (kg): 68.2 (07-16-23 @ 20:30)  BMI (kg/m2): 27.5 (07-16-23 @ 20:30)  BSA (m2): 1.69 (07-16-23 @ 20:30)  CAPILLARY BLOOD GLUCOSE        I&O's Summary    16 Jul 2023 07:01  -  17 Jul 2023 07:00  --------------------------------------------------------  IN: 0 mL / OUT: 1150 mL / NET: -1150 mL      Urinalysis Basic - ( 16 Jul 2023 07:17 )    Color: x / Appearance: x / SG: x / pH: x  Gluc: 109 mg/dL / Ketone: x  / Bili: x / Urobili: x   Blood: x / Protein: x / Nitrite: x   Leuk Esterase: x / RBC: x / WBC x   Sq Epi: x / Non Sq Epi: x / Bacteria: x        RADIOLOGY & ADDITIONAL TESTS:  Imaging Personally Reviewed:  [x] YES  [ ] NO    HEALTH ISSUES - PROBLEM Dx:          MEDS:  acetaminophen     Tablet .. 650 milliGRAM(s) Oral every 6 hours PRN  amLODIPine   Tablet 5 milliGRAM(s) Oral daily  chlorhexidine 2% Cloths 1 Application(s) Topical <User Schedule>  lactated ringers. 1000 milliLiter(s) IV Continuous <Continuous>  lidocaine   4% Patch 1 Patch Transdermal daily  polyethylene glycol 3350 17 Gram(s) Oral daily  senna 2 Tablet(s) Oral at bedtime  tamsulosin 0.4 milliGRAM(s) Oral at bedtime

## 2023-07-17 NOTE — PROGRESS NOTE ADULT - ASSESSMENT
78-year-old female with history of bladder cancer (Diagnosed 10 years ago, s/p Intravesical therapy, No chemo or radiation, in remission), HTN and HLD presenting for Hematuria and decreased urine output. At baseline, patient is independent in ambulation. Imaging showing likely recurrence of bladder CA.    #Recurrence of bladder cancer w/ concern for mets  #Hematuria  #Severe Right hydronephrosis  - CT A&P shows large infiltrative soft tissue mass in the pelvis involving bladder and appears to invade the uterus. Severe right sided hydronephrosis; adrenal nodule, splenic hypodensities and pulmonary nodule  - low risk for moderate risk procedure. Further cardiac workup is not indicated  - s/p IV ceftriaxone 1g q24h  PLAN  - continue gutierrez - frequent Bladder irrigation as per urology  - c/w flomax  - f/u repeat Ucx  - Urology - per Dr. Mabry: cystoscopy and biopsy planned for today  - IR- IR guided PCN for right sided hydro if failure of cystoscopy   - keep Active T&S, monitor CBC    #CKD 3b- stable- Cr from 2022 stable  #HTN  - On Lisinopril and HCTZ at home  - lisinopril and HCTZ on hold  - continue amlodipine                                                                           DVT prophylaxis: SCD  GI prophylaxis: Not indicated  Diet: DASH/TLC  Activity: AAT  Code status: Full code    Progress Note Handoff  Pending Consults: None  Pending Tests: OR today  Pending Results: clinical improvement   Family Discussion: Discussed labs, meds,   Disposition: Home__Xlikely dc in 24-48 hrs___/SNF______/Other_____/Unknown at this time_____  Spent over 55 min reviewing chart, speaking with patient/family and on coordinating patient care during interdisciplinary rounds

## 2023-07-17 NOTE — PROGRESS NOTE ADULT - NS ATTEND AMEND GEN_ALL_CORE FT
I personally saw and examined the patient, reviewed the chart and available data. I discussed the situation with the patient and The urology team.  Decision to proceed with surgery tomorrow was finalized today based on the final culture and review of her remaining questions.  I also reviewed and/or amended the note as necessary.
I personally saw and examined the patient, reviewed the chart and available data. I discussed the situation with the patient and her granddaughter as well as  team. I also reviewed and/or amended the note as necessary.
bladder mass for cysto, turbt.  risks, benefits, alternatives discussed including unresectability, staged procedure, multiple subsequent therapies, possible future n tube, catheter, bleeding, infection
seen on july 13th  large bladder mass  Dr Mabry planning on cysto with resection on bladder tumor on monday if urine culture is negative   plan for med clearance and transfer to [facility] ER on Sunday
I personally saw and examined the patient, reviewed the chart and available data. I discussed the situation with the patient and  PA team. I also reviewed and/or amended the note as necessary.

## 2023-07-17 NOTE — PROGRESS NOTE ADULT - PROVIDER SPECIALTY LIST ADULT
Urology
Hospitalist
Hospitalist
Internal Medicine
Internal Medicine
Urology
Urology
Internal Medicine
Urology

## 2023-07-17 NOTE — BRIEF OPERATIVE NOTE - OPERATION/FINDINGS
3,5 cm solid bladder tumor right trigone obliterating right ureteral orifice.   2 cm papillary tumor superior bladder

## 2023-07-17 NOTE — CHART NOTE - NSCHARTNOTEFT_GEN_A_CORE
Case D/W Dr. Mabry: pt s/p TURBT today   - continue gutierrez for now; may D/C it tomorrow 7/18  - if medically stable, may D/C home tomorrow from  perspective and F/U office in 1 week  -does not need any antibiotics upon D/C

## 2023-07-18 ENCOUNTER — TRANSCRIPTION ENCOUNTER (OUTPATIENT)
Age: 78
End: 2023-07-18

## 2023-07-18 VITALS
DIASTOLIC BLOOD PRESSURE: 62 MMHG | RESPIRATION RATE: 16 BRPM | SYSTOLIC BLOOD PRESSURE: 118 MMHG | HEART RATE: 63 BPM | TEMPERATURE: 96 F

## 2023-07-18 LAB
ANION GAP SERPL CALC-SCNC: 8 MMOL/L — SIGNIFICANT CHANGE UP (ref 7–14)
BUN SERPL-MCNC: 22 MG/DL — HIGH (ref 10–20)
CALCIUM SERPL-MCNC: 8.9 MG/DL — SIGNIFICANT CHANGE UP (ref 8.4–10.5)
CHLORIDE SERPL-SCNC: 107 MMOL/L — SIGNIFICANT CHANGE UP (ref 98–110)
CO2 SERPL-SCNC: 26 MMOL/L — SIGNIFICANT CHANGE UP (ref 17–32)
CREAT SERPL-MCNC: 1 MG/DL — SIGNIFICANT CHANGE UP (ref 0.7–1.5)
EGFR: 57 ML/MIN/1.73M2 — LOW
GLUCOSE SERPL-MCNC: 120 MG/DL — HIGH (ref 70–99)
HCT VFR BLD CALC: 36.4 % — LOW (ref 37–47)
HGB BLD-MCNC: 11.4 G/DL — LOW (ref 12–16)
MCHC RBC-ENTMCNC: 27.4 PG — SIGNIFICANT CHANGE UP (ref 27–31)
MCHC RBC-ENTMCNC: 31.3 G/DL — LOW (ref 32–37)
MCV RBC AUTO: 87.5 FL — SIGNIFICANT CHANGE UP (ref 81–99)
NRBC # BLD: 0 /100 WBCS — SIGNIFICANT CHANGE UP (ref 0–0)
PLATELET # BLD AUTO: 313 K/UL — SIGNIFICANT CHANGE UP (ref 130–400)
PMV BLD: 8.5 FL — SIGNIFICANT CHANGE UP (ref 7.4–10.4)
POTASSIUM SERPL-MCNC: 5.2 MMOL/L — HIGH (ref 3.5–5)
POTASSIUM SERPL-SCNC: 5.2 MMOL/L — HIGH (ref 3.5–5)
RBC # BLD: 4.16 M/UL — LOW (ref 4.2–5.4)
RBC # FLD: 14.6 % — HIGH (ref 11.5–14.5)
SODIUM SERPL-SCNC: 141 MMOL/L — SIGNIFICANT CHANGE UP (ref 135–146)
WBC # BLD: 8.06 K/UL — SIGNIFICANT CHANGE UP (ref 4.8–10.8)
WBC # FLD AUTO: 8.06 K/UL — SIGNIFICANT CHANGE UP (ref 4.8–10.8)

## 2023-07-18 PROCEDURE — 99239 HOSP IP/OBS DSCHRG MGMT >30: CPT

## 2023-07-18 RX ORDER — SODIUM CHLORIDE 9 MG/ML
1000 INJECTION, SOLUTION INTRAVENOUS
Refills: 0 | Status: DISCONTINUED | OUTPATIENT
Start: 2023-07-18 | End: 2023-07-18

## 2023-07-18 RX ADMIN — LIDOCAINE 1 PATCH: 4 CREAM TOPICAL at 00:39

## 2023-07-18 RX ADMIN — AMLODIPINE BESYLATE 5 MILLIGRAM(S): 2.5 TABLET ORAL at 06:34

## 2023-07-18 RX ADMIN — SODIUM CHLORIDE 100 MILLILITER(S): 9 INJECTION, SOLUTION INTRAVENOUS at 00:35

## 2023-07-18 RX ADMIN — LIDOCAINE 1 PATCH: 4 CREAM TOPICAL at 11:08

## 2023-07-18 RX ADMIN — Medication 650 MILLIGRAM(S): at 04:15

## 2023-07-18 RX ADMIN — Medication 650 MILLIGRAM(S): at 11:06

## 2023-07-18 RX ADMIN — Medication 650 MILLIGRAM(S): at 03:26

## 2023-07-18 RX ADMIN — Medication 650 MILLIGRAM(S): at 12:00

## 2023-07-18 RX ADMIN — SODIUM CHLORIDE 100 MILLILITER(S): 9 INJECTION, SOLUTION INTRAVENOUS at 11:03

## 2023-07-18 NOTE — DISCHARGE NOTE NURSING/CASE MANAGEMENT/SOCIAL WORK - NSDCPEFALRISK_GEN_ALL_CORE
For information on Fall & Injury Prevention, visit: [email or web address] OR  [email or web address] OR  https://www.cdc.gov/steadi/patient.html

## 2023-07-18 NOTE — CHART NOTE - NSCHARTNOTESELECT_GEN_ALL_CORE
PA Note/Event Note
URO PA/Event Note
UROLOGY/Event Note
Urology
dysuria
Transfer Note
Transfer Note
Event Note
(4) rarely moist

## 2023-07-18 NOTE — DISCHARGE NOTE PROVIDER - CARE PROVIDER_API CALL
Shania Mabry  Urology  [address] [place] [address], [address] [address]  Phone: [phone]  Fax: [phone]  Scheduled Appointment: 08/01/2023    PCP,   Phone: (   )    -  Fax: (   )    -  Follow Up Time: 1 week

## 2023-07-18 NOTE — DISCHARGE NOTE PROVIDER - NSDCCPCAREPLAN_GEN_ALL_CORE_FT
PRINCIPAL DISCHARGE DIAGNOSIS  Diagnosis: Bladder mass  Assessment and Plan of Treatment: Patient was admitted for recurrence of bladder cancer w/ concern for mets and Hematuria and Severe Right hydronephrosis. CT A&P shows large infiltrative soft tissue mass in the pelvis involving bladder and appears to invade the uterus. Severe right sided hydronephrosis; adrenal nodule, splenic hypodensities and pulmonary nodule. Patient was treated with IV rocephin for suspected UTI. Patient was continued on flomax. Urology evaluated her and patient underwent Cystoscopy with fulguration and resection of bladder tumor. Patient's gutierrez catheter was removed and patient was cleared for urology for discharge.  Things to follow up:  -PCP follow up in 1-2 weeks  -Follow up with urologist Dr. Mabry on 8/1/2023   -Continue medications as instructed      SECONDARY DISCHARGE DIAGNOSES  Diagnosis: Urinary retention  Assessment and Plan of Treatment:

## 2023-07-18 NOTE — CHART NOTE - NSCHARTNOTEFT_GEN_A_CORE
Pt seen this AM- urine clear, orange tinged.    May DC gutierrez and DC home fro  [facility] as noted last PM    Pt knows to fup with Dr Mabry 1-2 weeks for pathology and further tx plan.   Also d/w pts daughter Cindy.

## 2023-07-18 NOTE — DISCHARGE NOTE NURSING/CASE MANAGEMENT/SOCIAL WORK - PATIENT PORTAL LINK FT
You can access the FollowMyHealth Patient Portal offered by [facility] by registering at the following website: [email or web address]. By joining [facility]’s FollowMyHealth portal, you will also be able to view your health information using other applications (apps) compatible with our system.

## 2023-07-18 NOTE — DISCHARGE NOTE PROVIDER - PROVIDER TOKENS
PROVIDER:[TOKEN:[[id number]],SCHEDULEDAPPT:[08/01/2023]],FREE:[LAST:[PCP],PHONE:[(   )    -],FAX:[(   )    -],FOLLOWUP:[1 week]]

## 2023-07-18 NOTE — DISCHARGE NOTE PROVIDER - HOSPITAL COURSE
78-year-old female with history of bladder cancer (Diagnosed 10 years ago, s/p Intravesical therapy, No chemo or radiation, in remission), HTN and HLD presenting for Hematuria and decreased urine output. At baseline, patient is independent in ambulation. Imaging showing likely recurrence of bladder CA. Patient was admitted for recurrence of bladder cancer w/ concern for mets and Hematuria and Severe Right hydronephrosis. CT A&P shows large infiltrative soft tissue mass in the pelvis involving bladder and appears to invade the uterus. Severe right sided hydronephrosis; adrenal nodule, splenic hypodensities and pulmonary nodule. Patient was treated with IV rocephin for suspected UTI. Patient was continued on flomax. Urology evaluated her and patient underwent Cystoscopy with fulguration and resection of bladder tumor. Patient's gutierrez catheter was removed and patient was cleared for urology for discharge.    Things to follow up:  -PCP follow up in 1-2 weeks  -Follow up with urologist Dr. Mabry on 8/1/2023   -Continue medications as instructed     Patient was seen and examined at bedside; patient denies any complaints; discharge planning was discussed.  Vital Signs Last 24 Hrs  T(C): 35.8 (18 Jul 2023 10:39), Max: 36.9 (17 Jul 2023 17:08)  T(F): 96.4 (18 Jul 2023 10:39), Max: 98.4 (17 Jul 2023 17:08)  HR: 63 (18 Jul 2023 10:39) (62 - 97)  BP: 118/62 (18 Jul 2023 10:39) (118/62 - 169/81)  BP(mean): 83 (17 Jul 2023 14:47) (83 - 83)  RR: 16 (18 Jul 2023 10:39) (14 - 18)  SpO2: 96% (17 Jul 2023 17:53) (95% - 98%)    Parameters below as of 17 Jul 2023 17:08  Patient On (Oxygen Delivery Method): room air    GENERAL: NAD, well-groomed, well-developed  HEAD:  Atraumatic, Normocephalic  EYES: EOMI, PERRLA, conjunctiva and sclera clear  ENMT: No tonsillar erythema, exudates, or enlargement; Moist mucous membranes  NECK: Supple, No JVD, Normal thyroid  NERVOUS SYSTEM:  Alert & Oriented X3, Good concentration; Motor Strength 5/5 B/L upper and lower extremities  CHEST/LUNG: Clear to percussion bilaterally; No rales, rhonchi, wheezing, or rubs  HEART: Regular rate and rhythm; No murmurs, rubs, or gallops  ABDOMEN: Soft, Nontender, Nondistended; Bowel sounds present  : no suprapubic tenderness  EXTREMITIES:  2+ Peripheral Pulses, No clubbing, cyanosis, or edema

## 2023-07-18 NOTE — DISCHARGE NOTE PROVIDER - NSDCFUADDINST_GEN_ALL_CORE_FT
Things to follow up:  -PCP follow up in 1-2 weeks  -Follow up with urologist Dr. Mabry on 8/1/2023   -Continue medications as instructed

## 2023-07-25 LAB — SURGICAL PATHOLOGY STUDY: SIGNIFICANT CHANGE UP

## 2023-07-26 ENCOUNTER — APPOINTMENT (OUTPATIENT)
Dept: UROLOGY | Facility: CLINIC | Age: 78
End: 2023-07-26
Payer: MEDICARE

## 2023-07-26 ENCOUNTER — TRANSCRIPTION ENCOUNTER (OUTPATIENT)
Age: 78
End: 2023-07-26

## 2023-07-26 VITALS
OXYGEN SATURATION: 98 % | BODY MASS INDEX: 24.84 KG/M2 | RESPIRATION RATE: 16 BRPM | SYSTOLIC BLOOD PRESSURE: 144 MMHG | HEIGHT: 62 IN | WEIGHT: 135 LBS | DIASTOLIC BLOOD PRESSURE: 78 MMHG | HEART RATE: 75 BPM | TEMPERATURE: 97.4 F

## 2023-07-26 DIAGNOSIS — R93.49 ABNORMAL RADIOLOGIC FINDINGS ON DIAGNOSITIC IMAGING OF OTHER URINARY ORGANS: ICD-10-CM

## 2023-07-26 DIAGNOSIS — C67.9 MALIGNANT NEOPLASM OF BLADDER, UNSPECIFIED: ICD-10-CM

## 2023-07-26 DIAGNOSIS — N13.30 UNSPECIFIED HYDRONEPHROSIS: ICD-10-CM

## 2023-07-26 PROCEDURE — 99214 OFFICE O/P EST MOD 30 MIN: CPT

## 2023-07-26 NOTE — ASSESSMENT
Pharmacist Admission Medication Reconciliation Pending Note    Prior to Admission Medications were reviewed by the pharmacist and pended for provider review during admission medication reconciliation.    Medications were pended by the pharmacist at this time as follows:    Pended Admission Order Reconciliation Actions - Hernando Reeves [facility] 11/6/2021  4:19 PM     Order Name Action    naproxen sodium (ALEVE) 220 MG tablet Do Not Order for Admission    acetaminophen (TYLENOL) 500 MG tablet Do Not Order for Admission                  Pharmacist Notations:                   Orders that are ultimately reconciled and signed during admission medication reconciliation may differ from the pended actions above.    Please contact the pharmacist for questions.    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 Saldivar.  I discussed with her in general treatment with cystectomy and urinary diversion versus systemic chemotherapy and radiation therapy provided tumor is localized.  If there is signs of metastatic disease then recommend oncology consultation for chemotherapy.  PET scan ordered.

## 2023-07-26 NOTE — HISTORY OF PRESENT ILLNESS
[FreeTextEntry1] : 78-year-old with recent hospitalization for gross hematuria.  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  She is found on CAT scan to have a large bladder mass with chronic right hydronephrosis and diminished right renal parenchyma.  There is also nonspecific splenic hypodensities, a right adrenal nodule and a 4 mm right pulmonary nodule.\par \par She underwent transurethral resection which showed multifocal high-grade transitional cell carcinoma.  In the superior bladder wall this was superficial TA.  In the area of the right trigone completely obliterating the right ureteral orifice was high-grade muscle invasive cancer\par \par Currently she feels very well without any hematuria.

## 2023-07-27 ENCOUNTER — OUTPATIENT (OUTPATIENT)
Dept: OUTPATIENT SERVICES | Facility: HOSPITAL | Age: 78
LOS: 1 days | End: 2023-07-27
Payer: MEDICARE

## 2023-07-27 DIAGNOSIS — Z00.8 ENCOUNTER FOR OTHER GENERAL EXAMINATION: ICD-10-CM

## 2023-07-27 DIAGNOSIS — R93.49 ABNORMAL RADIOLOGIC FINDINGS ON DIAGNOSTIC IMAGING OF OTHER URINARY ORGANS: ICD-10-CM

## 2023-07-27 LAB — GLUCOSE BLDC GLUCOMTR-MCNC: 99 MG/DL — SIGNIFICANT CHANGE UP (ref 70–99)

## 2023-07-27 PROCEDURE — A9552: CPT

## 2023-07-27 PROCEDURE — 78815 PET IMAGE W/CT SKULL-THIGH: CPT | Mod: PI

## 2023-07-27 PROCEDURE — 78815 PET IMAGE W/CT SKULL-THIGH: CPT | Mod: 26,PI,MH

## 2023-07-27 PROCEDURE — 82962 GLUCOSE BLOOD TEST: CPT

## 2023-07-28 DIAGNOSIS — R93.49 ABNORMAL RADIOLOGIC FINDINGS ON DIAGNOSTIC IMAGING OF OTHER URINARY ORGANS: ICD-10-CM

## 2023-07-30 DIAGNOSIS — C79.82 SECONDARY MALIGNANT NEOPLASM OF GENITAL ORGANS: ICD-10-CM

## 2023-07-30 DIAGNOSIS — C79.19 SECONDARY MALIGNANT NEOPLASM OF OTHER URINARY ORGANS: ICD-10-CM

## 2023-07-30 DIAGNOSIS — Z87.891 PERSONAL HISTORY OF NICOTINE DEPENDENCE: ICD-10-CM

## 2023-07-30 DIAGNOSIS — N18.32 CHRONIC KIDNEY DISEASE, STAGE 3B: ICD-10-CM

## 2023-07-30 DIAGNOSIS — Z96.0 PRESENCE OF UROGENITAL IMPLANTS: ICD-10-CM

## 2023-07-30 DIAGNOSIS — I12.9 HYPERTENSIVE CHRONIC KIDNEY DISEASE WITH STAGE 1 THROUGH STAGE 4 CHRONIC KIDNEY DISEASE, OR UNSPECIFIED CHRONIC KIDNEY DISEASE: ICD-10-CM

## 2023-07-30 DIAGNOSIS — E78.5 HYPERLIPIDEMIA, UNSPECIFIED: ICD-10-CM

## 2023-07-30 DIAGNOSIS — N13.6 PYONEPHROSIS: ICD-10-CM

## 2023-07-30 DIAGNOSIS — R33.9 RETENTION OF URINE, UNSPECIFIED: ICD-10-CM

## 2023-07-30 DIAGNOSIS — R31.0 GROSS HEMATURIA: ICD-10-CM

## 2023-07-30 DIAGNOSIS — R91.8 OTHER NONSPECIFIC ABNORMAL FINDING OF LUNG FIELD: ICD-10-CM

## 2023-07-30 DIAGNOSIS — E27.9 DISORDER OF ADRENAL GLAND, UNSPECIFIED: ICD-10-CM

## 2023-07-30 DIAGNOSIS — K83.8 OTHER SPECIFIED DISEASES OF BILIARY TRACT: ICD-10-CM

## 2023-07-30 DIAGNOSIS — C67.9 MALIGNANT NEOPLASM OF BLADDER, UNSPECIFIED: ICD-10-CM

## 2023-07-30 DIAGNOSIS — B96.20 UNSPECIFIED ESCHERICHIA COLI [E. COLI] AS THE CAUSE OF DISEASES CLASSIFIED ELSEWHERE: ICD-10-CM

## 2023-08-07 ENCOUNTER — APPOINTMENT (OUTPATIENT)
Dept: UROLOGY | Facility: CLINIC | Age: 78
End: 2023-08-07

## 2023-09-19 NOTE — ED ADULT NURSE NOTE - NS ED NURSE LEVEL OF CONSCIOUSNESS AFFECT
Calm Humira Counseling:  I discussed with the patient the risks of adalimumab including but not limited to myelosuppression, immunosuppression, autoimmune hepatitis, demyelinating diseases, lymphoma, and serious infections.  The patient understands that monitoring is required including a PPD at baseline and must alert us or the primary physician if symptoms of infection or other concerning signs are noted.

## 2024-04-02 ENCOUNTER — APPOINTMENT (OUTPATIENT)
Dept: PULMONOLOGY | Facility: CLINIC | Age: 79
End: 2024-04-02
Payer: MEDICARE

## 2024-04-02 PROCEDURE — 94729 DIFFUSING CAPACITY: CPT

## 2024-04-02 PROCEDURE — 94727 GAS DIL/WSHOT DETER LNG VOL: CPT

## 2024-04-02 PROCEDURE — 94060 EVALUATION OF WHEEZING: CPT

## 2025-06-04 ENCOUNTER — NON-APPOINTMENT (OUTPATIENT)
Age: 80
End: 2025-06-04

## 2025-06-04 ENCOUNTER — APPOINTMENT (OUTPATIENT)
Dept: NEUROLOGY | Facility: CLINIC | Age: 80
End: 2025-06-04
Payer: MEDICARE

## 2025-06-04 VITALS
OXYGEN SATURATION: 96 % | HEART RATE: 52 BPM | DIASTOLIC BLOOD PRESSURE: 60 MMHG | RESPIRATION RATE: 16 BRPM | HEIGHT: 62 IN | WEIGHT: 146 LBS | SYSTOLIC BLOOD PRESSURE: 127 MMHG | BODY MASS INDEX: 26.87 KG/M2

## 2025-06-04 DIAGNOSIS — G62.9 POLYNEUROPATHY, UNSPECIFIED: ICD-10-CM

## 2025-06-04 DIAGNOSIS — G56.00 CARPAL TUNNEL SYNDROME, UNSPECIFIED UPPER LIMB: ICD-10-CM

## 2025-06-04 DIAGNOSIS — Z80.8 FAMILY HISTORY OF MALIGNANT NEOPLASM OF OTHER ORGANS OR SYSTEMS: ICD-10-CM

## 2025-06-04 DIAGNOSIS — I10 ESSENTIAL (PRIMARY) HYPERTENSION: ICD-10-CM

## 2025-06-04 DIAGNOSIS — Z87.442 PERSONAL HISTORY OF URINARY CALCULI: ICD-10-CM

## 2025-06-04 DIAGNOSIS — Z78.9 OTHER SPECIFIED HEALTH STATUS: ICD-10-CM

## 2025-06-04 PROCEDURE — 99204 OFFICE O/P NEW MOD 45 MIN: CPT

## 2025-06-04 PROCEDURE — G2211 COMPLEX E/M VISIT ADD ON: CPT

## 2025-06-04 RX ORDER — VALSARTAN 160 MG/1
160 TABLET, COATED ORAL TWICE DAILY
Refills: 0 | Status: ACTIVE | COMMUNITY

## 2025-06-04 RX ORDER — CYANOCOBALAMIN (VITAMIN B-12) 1000 MCG
TABLET ORAL DAILY
Refills: 0 | Status: ACTIVE | COMMUNITY

## 2025-06-04 RX ORDER — NIFEDIPINE 20 MG/1
CAPSULE ORAL
Refills: 0 | Status: ACTIVE | COMMUNITY

## 2025-06-04 RX ORDER — ROSUVASTATIN CALCIUM 10 MG/1
10 TABLET, FILM COATED ORAL DAILY
Refills: 0 | Status: ACTIVE | COMMUNITY

## 2025-07-09 ENCOUNTER — LABORATORY RESULT (OUTPATIENT)
Age: 80
End: 2025-07-09

## 2025-07-09 ENCOUNTER — APPOINTMENT (OUTPATIENT)
Dept: NEUROLOGY | Facility: CLINIC | Age: 80
End: 2025-07-09
Payer: MEDICARE

## 2025-07-09 PROBLEM — Z86.69 HISTORY OF CARPAL TUNNEL SYNDROME: Status: RESOLVED | Noted: 2025-06-04 | Resolved: 2025-07-09

## 2025-07-09 PROCEDURE — 95885 MUSC TST DONE W/NERV TST LIM: CPT

## 2025-07-09 PROCEDURE — 95913 NRV CNDJ TEST 13/> STUDIES: CPT

## 2025-07-10 LAB
FOLATE SERPL-MCNC: 12.1 NG/ML
VIT B12 SERPL-MCNC: 457 PG/ML

## 2025-07-11 LAB
ALBUMIN MFR SERPL ELPH: 64.5 %
ALBUMIN SERPL-MCNC: 3.8 G/DL
ALBUMIN/GLOB SERPL: 1.8 RATIO
ALPHA1 GLOB MFR SERPL ELPH: 4.8 %
ALPHA1 GLOB SERPL ELPH-MCNC: 0.3 G/DL
ALPHA2 GLOB MFR SERPL ELPH: 9.8 %
ALPHA2 GLOB SERPL ELPH-MCNC: 0.6 G/DL
B-GLOBULIN MFR SERPL ELPH: 12.3 %
B-GLOBULIN SERPL ELPH-MCNC: 0.7 G/DL
GAMMA GLOB FLD ELPH-MCNC: 0.5 G/DL
GAMMA GLOB MFR SERPL ELPH: 8.6 %
IGA 24H UR QL IFE: NORMAL
INTERPRETATION SERPL IEP-IMP: NORMAL
M PROTEIN SPEC IFE-MCNC: NORMAL
PROT SERPL-MCNC: 5.9 G/DL
PROT SERPL-MCNC: 5.9 G/DL

## 2025-07-15 LAB
ASIALO-GM1 ANTIBODIES, IGG/IGM: 160 IV
GD1A ANTIBODIES, IGG/IGM: 5 IV
GD1B ANTIBODIES, IGG/IGM: 5 IV
GM1 ANTIBODIES, IGG/IGM: 35 IV
GM2 ANTIBODIES, IGG/IGM: 5 IV
GQ1B ANTIBODIES, IGG/IGM: 5 IV

## 2025-07-17 LAB
ARSENIC BLD-MCNC: 2 UG/L
CADMIUM SERPL-MCNC: 0.7 UG/L
LEAD BLD-MCNC: 2.4 UG/DL
MERCURY BLD-MCNC: <1 UG/L

## 2025-07-19 LAB
AMPHIPHYSIN IGG TITR SER IF: NEGATIVE
ANNOTATION COMMENT IMP: NORMAL
CV2 AB SERPL QL IF: NEGATIVE
GLIAL NUC TYPE 1 AB TITR SER: NEGATIVE
HU1 AB SER QL: NEGATIVE
HU2 AB TITR SER IF: NEGATIVE
HU3 AB TITR SER: NEGATIVE
INTERPRETIVE COMMENTS: NORMAL
LGI1 IGG SER QL CBA IFA: NEGATIVE
PCA-1 AB TITR SER: NEGATIVE
PCA-TR AB TITR SER: NEGATIVE

## 2025-08-20 ENCOUNTER — APPOINTMENT (OUTPATIENT)
Dept: NEUROLOGY | Facility: CLINIC | Age: 80
End: 2025-08-20
Payer: MEDICARE

## 2025-08-20 VITALS
RESPIRATION RATE: 16 BRPM | DIASTOLIC BLOOD PRESSURE: 65 MMHG | OXYGEN SATURATION: 97 % | HEART RATE: 53 BPM | SYSTOLIC BLOOD PRESSURE: 134 MMHG | BODY MASS INDEX: 26.58 KG/M2 | WEIGHT: 150 LBS | HEIGHT: 63 IN

## 2025-08-20 DIAGNOSIS — G62.9 POLYNEUROPATHY, UNSPECIFIED: ICD-10-CM

## 2025-08-20 DIAGNOSIS — E53.8 DEFICIENCY OF OTHER SPECIFIED B GROUP VITAMINS: ICD-10-CM

## 2025-08-20 PROCEDURE — G2211 COMPLEX E/M VISIT ADD ON: CPT

## 2025-08-20 PROCEDURE — 99214 OFFICE O/P EST MOD 30 MIN: CPT

## 2025-09-11 ENCOUNTER — APPOINTMENT (OUTPATIENT)
Age: 80
End: 2025-09-11

## 2025-09-11 VITALS
WEIGHT: 151 LBS | BODY MASS INDEX: 26.75 KG/M2 | SYSTOLIC BLOOD PRESSURE: 149 MMHG | HEIGHT: 63 IN | RESPIRATION RATE: 16 BRPM | OXYGEN SATURATION: 97 % | TEMPERATURE: 97.9 F | DIASTOLIC BLOOD PRESSURE: 73 MMHG | HEART RATE: 53 BPM

## 2025-09-11 DIAGNOSIS — G62.9 POLYNEUROPATHY, UNSPECIFIED: ICD-10-CM

## 2025-09-11 PROCEDURE — 99204 OFFICE O/P NEW MOD 45 MIN: CPT

## 2025-09-11 RX ORDER — NEBIVOLOL HYDROCHLORIDE 10 MG/1
10 TABLET ORAL
Refills: 0 | Status: ACTIVE | COMMUNITY

## 2025-09-11 RX ORDER — CLOPIDOGREL BISULFATE 75 MG/1
75 TABLET, FILM COATED ORAL
Refills: 0 | Status: ACTIVE | COMMUNITY